# Patient Record
Sex: FEMALE | NOT HISPANIC OR LATINO | Employment: FULL TIME | URBAN - METROPOLITAN AREA
[De-identification: names, ages, dates, MRNs, and addresses within clinical notes are randomized per-mention and may not be internally consistent; named-entity substitution may affect disease eponyms.]

---

## 2017-01-23 ENCOUNTER — APPOINTMENT (OUTPATIENT)
Dept: GENERAL RADIOLOGY | Facility: HOSPITAL | Age: 52
End: 2017-01-23

## 2017-01-23 ENCOUNTER — HOSPITAL ENCOUNTER (EMERGENCY)
Facility: HOSPITAL | Age: 52
Discharge: HOME | End: 2017-01-23
Attending: EMERGENCY MEDICINE | Admitting: EMERGENCY MEDICINE

## 2017-01-23 VITALS
SYSTOLIC BLOOD PRESSURE: 142 MMHG | OXYGEN SATURATION: 100 % | WEIGHT: 170 LBS | DIASTOLIC BLOOD PRESSURE: 71 MMHG | TEMPERATURE: 100.6 F | HEART RATE: 109 BPM | HEIGHT: 69 IN | RESPIRATION RATE: 18 BRPM | BODY MASS INDEX: 25.18 KG/M2

## 2017-01-23 DIAGNOSIS — J44.9 ASTHMATIC BRONCHITIS , CHRONIC (HCC): Primary | ICD-10-CM

## 2017-01-23 LAB
ALBUMIN SERPL-MCNC: 3.3 G/DL (ref 3.5–5.2)
ALBUMIN/GLOB SERPL: 0.9 G/DL
ALP SERPL-CCNC: 92 U/L (ref 39–117)
ALT SERPL W P-5'-P-CCNC: 31 U/L (ref 1–33)
ANION GAP SERPL CALCULATED.3IONS-SCNC: 13.3 MMOL/L
AST SERPL-CCNC: 15 U/L (ref 1–32)
BASOPHILS # BLD AUTO: 0.01 10*3/MM3 (ref 0–0.2)
BASOPHILS NFR BLD AUTO: 0.1 % (ref 0–1.5)
BILIRUB SERPL-MCNC: 0.4 MG/DL (ref 0.1–1.2)
BUN BLD-MCNC: 14 MG/DL (ref 6–20)
BUN/CREAT SERPL: 19.7 (ref 7–25)
CALCIUM SPEC-SCNC: 8.9 MG/DL (ref 8.6–10.5)
CHLORIDE SERPL-SCNC: 101 MMOL/L (ref 98–107)
CO2 SERPL-SCNC: 25.7 MMOL/L (ref 22–29)
CREAT BLD-MCNC: 0.71 MG/DL (ref 0.57–1)
D-LACTATE SERPL-SCNC: 0.9 MMOL/L (ref 0.5–2)
DEPRECATED RDW RBC AUTO: 47.7 FL (ref 37–54)
EOSINOPHIL # BLD AUTO: 0.04 10*3/MM3 (ref 0–0.7)
EOSINOPHIL NFR BLD AUTO: 0.4 % (ref 0.3–6.2)
ERYTHROCYTE [DISTWIDTH] IN BLOOD BY AUTOMATED COUNT: 17.3 % (ref 11.7–13)
FLUAV AG NPH QL: NEGATIVE
FLUBV AG NPH QL IA: NEGATIVE
GFR SERPL CREATININE-BSD FRML MDRD: 86 ML/MIN/1.73
GLOBULIN UR ELPH-MCNC: 3.5 GM/DL
GLUCOSE BLD-MCNC: 114 MG/DL (ref 65–99)
HCT VFR BLD AUTO: 30.7 % (ref 35.6–45.5)
HGB BLD-MCNC: 8.9 G/DL (ref 11.9–15.5)
HOLD SPECIMEN: NORMAL
IMM GRANULOCYTES # BLD: 0.03 10*3/MM3 (ref 0–0.03)
IMM GRANULOCYTES NFR BLD: 0.3 % (ref 0–0.5)
LYMPHOCYTES # BLD AUTO: 1.65 10*3/MM3 (ref 0.9–4.8)
LYMPHOCYTES NFR BLD AUTO: 15.6 % (ref 19.6–45.3)
MCH RBC QN AUTO: 21.8 PG (ref 26.9–32)
MCHC RBC AUTO-ENTMCNC: 29 G/DL (ref 32.4–36.3)
MCV RBC AUTO: 75.1 FL (ref 80.5–98.2)
MONOCYTES # BLD AUTO: 1.02 10*3/MM3 (ref 0.2–1.2)
MONOCYTES NFR BLD AUTO: 9.7 % (ref 5–12)
NEUTROPHILS # BLD AUTO: 7.81 10*3/MM3 (ref 1.9–8.1)
NEUTROPHILS NFR BLD AUTO: 73.9 % (ref 42.7–76)
NT-PROBNP SERPL-MCNC: 233.4 PG/ML (ref 5–900)
PLATELET # BLD AUTO: 310 10*3/MM3 (ref 140–500)
PMV BLD AUTO: 8.9 FL (ref 6–12)
POTASSIUM BLD-SCNC: 3.6 MMOL/L (ref 3.5–5.2)
PROT SERPL-MCNC: 6.8 G/DL (ref 6–8.5)
RBC # BLD AUTO: 4.09 10*6/MM3 (ref 3.9–5.2)
SODIUM BLD-SCNC: 140 MMOL/L (ref 136–145)
TROPONIN T SERPL-MCNC: <0.01 NG/ML (ref 0–0.03)
WBC NRBC COR # BLD: 10.56 10*3/MM3 (ref 4.5–10.7)
WHOLE BLOOD HOLD SPECIMEN: NORMAL

## 2017-01-23 PROCEDURE — 93005 ELECTROCARDIOGRAM TRACING: CPT

## 2017-01-23 PROCEDURE — 93010 ELECTROCARDIOGRAM REPORT: CPT | Performed by: INTERNAL MEDICINE

## 2017-01-23 PROCEDURE — 36415 COLL VENOUS BLD VENIPUNCTURE: CPT | Performed by: EMERGENCY MEDICINE

## 2017-01-23 PROCEDURE — 99284 EMERGENCY DEPT VISIT MOD MDM: CPT

## 2017-01-23 PROCEDURE — 94640 AIRWAY INHALATION TREATMENT: CPT

## 2017-01-23 PROCEDURE — 63710000001 PREDNISONE PER 5 MG: Performed by: EMERGENCY MEDICINE

## 2017-01-23 PROCEDURE — 80053 COMPREHEN METABOLIC PANEL: CPT | Performed by: EMERGENCY MEDICINE

## 2017-01-23 PROCEDURE — 84484 ASSAY OF TROPONIN QUANT: CPT | Performed by: EMERGENCY MEDICINE

## 2017-01-23 PROCEDURE — 83605 ASSAY OF LACTIC ACID: CPT | Performed by: EMERGENCY MEDICINE

## 2017-01-23 PROCEDURE — 83880 ASSAY OF NATRIURETIC PEPTIDE: CPT | Performed by: EMERGENCY MEDICINE

## 2017-01-23 PROCEDURE — 85025 COMPLETE CBC W/AUTO DIFF WBC: CPT | Performed by: EMERGENCY MEDICINE

## 2017-01-23 PROCEDURE — 87804 INFLUENZA ASSAY W/OPTIC: CPT | Performed by: EMERGENCY MEDICINE

## 2017-01-23 PROCEDURE — 71020 HC CHEST PA AND LATERAL: CPT

## 2017-01-23 PROCEDURE — 87186 SC STD MICRODIL/AGAR DIL: CPT | Performed by: EMERGENCY MEDICINE

## 2017-01-23 PROCEDURE — 87040 BLOOD CULTURE FOR BACTERIA: CPT | Performed by: EMERGENCY MEDICINE

## 2017-01-23 RX ORDER — PREDNISONE 20 MG/1
60 TABLET ORAL ONCE
Status: COMPLETED | OUTPATIENT
Start: 2017-01-23 | End: 2017-01-23

## 2017-01-23 RX ORDER — FLUTICASONE PROPIONATE 50 MCG
2 SPRAY, SUSPENSION (ML) NASAL DAILY
COMMUNITY
End: 2017-04-25 | Stop reason: SDUPTHER

## 2017-01-23 RX ORDER — PAROXETINE HYDROCHLORIDE 20 MG/1
10 TABLET, FILM COATED ORAL EVERY MORNING
COMMUNITY
End: 2017-04-25 | Stop reason: SDUPTHER

## 2017-01-23 RX ORDER — ISOSORBIDE DINITRATE 30 MG/1
30 TABLET ORAL 2 TIMES DAILY
COMMUNITY
End: 2017-02-03

## 2017-01-23 RX ORDER — ACETAMINOPHEN 500 MG
1000 TABLET ORAL ONCE
Status: COMPLETED | OUTPATIENT
Start: 2017-01-23 | End: 2017-01-23

## 2017-01-23 RX ORDER — AZITHROMYCIN 250 MG/1
250 TABLET, FILM COATED ORAL DAILY
Qty: 6 TABLET | Refills: 0 | Status: SHIPPED | OUTPATIENT
Start: 2017-01-23 | End: 2017-01-27 | Stop reason: HOSPADM

## 2017-01-23 RX ORDER — IPRATROPIUM BROMIDE AND ALBUTEROL SULFATE 2.5; .5 MG/3ML; MG/3ML
3 SOLUTION RESPIRATORY (INHALATION) ONCE
Status: COMPLETED | OUTPATIENT
Start: 2017-01-23 | End: 2017-01-23

## 2017-01-23 RX ORDER — LOSARTAN POTASSIUM 50 MG/1
50 TABLET ORAL DAILY
COMMUNITY
End: 2017-02-03

## 2017-01-23 RX ORDER — METHYLPREDNISOLONE SODIUM SUCCINATE 125 MG/2ML
125 INJECTION, POWDER, LYOPHILIZED, FOR SOLUTION INTRAMUSCULAR; INTRAVENOUS ONCE
Status: DISCONTINUED | OUTPATIENT
Start: 2017-01-23 | End: 2017-01-23

## 2017-01-23 RX ORDER — SODIUM CHLORIDE 0.9 % (FLUSH) 0.9 %
10 SYRINGE (ML) INJECTION AS NEEDED
Status: DISCONTINUED | OUTPATIENT
Start: 2017-01-23 | End: 2017-01-24 | Stop reason: HOSPADM

## 2017-01-23 RX ORDER — ATORVASTATIN CALCIUM 40 MG/1
40 TABLET, FILM COATED ORAL DAILY
COMMUNITY
End: 2017-04-25 | Stop reason: SDUPTHER

## 2017-01-23 RX ORDER — AMLODIPINE BESYLATE 2.5 MG/1
2.5 TABLET ORAL DAILY
COMMUNITY
End: 2017-02-03

## 2017-01-23 RX ORDER — METHYLPREDNISOLONE 4 MG/1
TABLET ORAL
Qty: 21 TABLET | Refills: 0 | Status: SHIPPED | OUTPATIENT
Start: 2017-01-23 | End: 2017-01-27 | Stop reason: HOSPADM

## 2017-01-23 RX ORDER — MONTELUKAST SODIUM 10 MG/1
10 TABLET ORAL NIGHTLY
COMMUNITY
End: 2017-04-25 | Stop reason: SDUPTHER

## 2017-01-23 RX ORDER — ASPIRIN 81 MG/1
81 TABLET, CHEWABLE ORAL DAILY
COMMUNITY
End: 2017-08-22

## 2017-01-23 RX ORDER — FUROSEMIDE 20 MG/1
20 TABLET ORAL DAILY
COMMUNITY
End: 2017-04-25 | Stop reason: SDUPTHER

## 2017-01-23 RX ADMIN — IPRATROPIUM BROMIDE AND ALBUTEROL SULFATE 3 ML: .5; 3 SOLUTION RESPIRATORY (INHALATION) at 22:11

## 2017-01-23 RX ADMIN — ACETAMINOPHEN 1000 MG: 500 TABLET ORAL at 17:48

## 2017-01-23 RX ADMIN — PREDNISONE 60 MG: 20 TABLET ORAL at 22:35

## 2017-01-23 NOTE — ED NOTES
Patient states she has been running a high fever for over a week, has had difficulty breathing, bronchitis and a cough, and now her asthma has been triggered. Patient to the ED in [place] and all they did was perform a chest x-ray to rule out pneumonia which was negative, and sent her home for a nebulizer, and cough medication.      Neida Lima RN  01/23/17 1311

## 2017-01-24 ENCOUNTER — HOSPITAL ENCOUNTER (INPATIENT)
Facility: HOSPITAL | Age: 52
LOS: 2 days | Discharge: HOME | End: 2017-01-27
Attending: EMERGENCY MEDICINE | Admitting: HOSPITALIST

## 2017-01-24 ENCOUNTER — APPOINTMENT (OUTPATIENT)
Dept: GENERAL RADIOLOGY | Facility: HOSPITAL | Age: 52
End: 2017-01-24

## 2017-01-24 DIAGNOSIS — N39.0 URINARY TRACT INFECTION IN FEMALE: ICD-10-CM

## 2017-01-24 DIAGNOSIS — R78.81 BACTEREMIA DUE TO GRAM-NEGATIVE BACTERIA: Primary | ICD-10-CM

## 2017-01-24 LAB
ALBUMIN SERPL-MCNC: 3.4 G/DL (ref 3.5–5.2)
ALBUMIN/GLOB SERPL: 0.9 G/DL
ALP SERPL-CCNC: 90 U/L (ref 39–117)
ALT SERPL W P-5'-P-CCNC: 26 U/L (ref 1–33)
ANION GAP SERPL CALCULATED.3IONS-SCNC: 13.6 MMOL/L
ANISOCYTOSIS BLD QL: NORMAL
AST SERPL-CCNC: 19 U/L (ref 1–32)
BACTERIA UR QL AUTO: ABNORMAL /HPF
BASOPHILS # BLD AUTO: 0.01 10*3/MM3 (ref 0–0.2)
BASOPHILS NFR BLD AUTO: 0.1 % (ref 0–1.5)
BILIRUB SERPL-MCNC: 0.2 MG/DL (ref 0.1–1.2)
BILIRUB UR QL STRIP: NEGATIVE
BUN BLD-MCNC: 16 MG/DL (ref 6–20)
BUN/CREAT SERPL: 22.2 (ref 7–25)
C3 FRG RBC-MCNC: NORMAL
CALCIUM SPEC-SCNC: 9 MG/DL (ref 8.6–10.5)
CHLORIDE SERPL-SCNC: 102 MMOL/L (ref 98–107)
CLARITY UR: ABNORMAL
CO2 SERPL-SCNC: 24.4 MMOL/L (ref 22–29)
COARSE GRAN CASTS URNS QL MICRO: ABNORMAL /LPF
COLOR UR: YELLOW
CREAT BLD-MCNC: 0.72 MG/DL (ref 0.57–1)
D-LACTATE SERPL-SCNC: 1.2 MMOL/L (ref 0.5–2)
DEPRECATED RDW RBC AUTO: 46.9 FL (ref 37–54)
ELLIPTOCYTES BLD QL SMEAR: NORMAL
EOSINOPHIL # BLD AUTO: 0 10*3/MM3 (ref 0–0.7)
EOSINOPHIL NFR BLD AUTO: 0 % (ref 0.3–6.2)
ERYTHROCYTE [DISTWIDTH] IN BLOOD BY AUTOMATED COUNT: 17.3 % (ref 11.7–13)
GFR SERPL CREATININE-BSD FRML MDRD: 85 ML/MIN/1.73
GIANT PLATELETS: NORMAL
GLOBULIN UR ELPH-MCNC: 3.7 GM/DL
GLUCOSE BLD-MCNC: 144 MG/DL (ref 65–99)
GLUCOSE UR STRIP-MCNC: NEGATIVE MG/DL
HCT VFR BLD AUTO: 30.7 % (ref 35.6–45.5)
HGB BLD-MCNC: 9 G/DL (ref 11.9–15.5)
HGB UR QL STRIP.AUTO: NEGATIVE
HYALINE CASTS UR QL AUTO: ABNORMAL /LPF
IMM GRANULOCYTES # BLD: 0.1 10*3/MM3 (ref 0–0.03)
IMM GRANULOCYTES NFR BLD: 0.7 % (ref 0–0.5)
KETONES UR QL STRIP: NEGATIVE
LEUKOCYTE ESTERASE UR QL STRIP.AUTO: ABNORMAL
LIPASE SERPL-CCNC: 23 U/L (ref 13–60)
LYMPHOCYTES # BLD AUTO: 1.13 10*3/MM3 (ref 0.9–4.8)
LYMPHOCYTES NFR BLD AUTO: 8.3 % (ref 19.6–45.3)
MACROCYTES BLD QL SMEAR: NORMAL
MCH RBC QN AUTO: 21.6 PG (ref 26.9–32)
MCHC RBC AUTO-ENTMCNC: 29.3 G/DL (ref 32.4–36.3)
MCV RBC AUTO: 73.8 FL (ref 80.5–98.2)
MICROCYTES BLD QL: NORMAL
MONOCYTES # BLD AUTO: 0.76 10*3/MM3 (ref 0.2–1.2)
MONOCYTES NFR BLD AUTO: 5.6 % (ref 5–12)
NEUTROPHILS # BLD AUTO: 11.55 10*3/MM3 (ref 1.9–8.1)
NEUTROPHILS NFR BLD AUTO: 85.3 % (ref 42.7–76)
NITRITE UR QL STRIP: NEGATIVE
NRBC BLD MANUAL-RTO: 0 /100 WBC (ref 0–0)
PH UR STRIP.AUTO: 6.5 [PH] (ref 5–8)
PLATELET # BLD AUTO: 401 10*3/MM3 (ref 140–500)
PMV BLD AUTO: 9.6 FL (ref 6–12)
POTASSIUM BLD-SCNC: 4 MMOL/L (ref 3.5–5.2)
PROCALCITONIN SERPL-MCNC: 0.26 NG/ML (ref 0.1–0.25)
PROT SERPL-MCNC: 7.1 G/DL (ref 6–8.5)
PROT UR QL STRIP: ABNORMAL
RBC # BLD AUTO: 4.16 10*6/MM3 (ref 3.9–5.2)
RBC # UR: ABNORMAL /HPF
REF LAB TEST METHOD: ABNORMAL
SODIUM BLD-SCNC: 140 MMOL/L (ref 136–145)
SP GR UR STRIP: 1.02 (ref 1–1.03)
SQUAMOUS #/AREA URNS HPF: ABNORMAL /HPF
UROBILINOGEN UR QL STRIP: ABNORMAL
WBC MORPH BLD: NORMAL
WBC NRBC COR # BLD: 13.55 10*3/MM3 (ref 4.5–10.7)
WBC UR QL AUTO: ABNORMAL /HPF

## 2017-01-24 PROCEDURE — 83605 ASSAY OF LACTIC ACID: CPT | Performed by: PHYSICIAN ASSISTANT

## 2017-01-24 PROCEDURE — 83690 ASSAY OF LIPASE: CPT | Performed by: PHYSICIAN ASSISTANT

## 2017-01-24 PROCEDURE — 81001 URINALYSIS AUTO W/SCOPE: CPT | Performed by: PHYSICIAN ASSISTANT

## 2017-01-24 PROCEDURE — 80053 COMPREHEN METABOLIC PANEL: CPT | Performed by: PHYSICIAN ASSISTANT

## 2017-01-24 PROCEDURE — 87186 SC STD MICRODIL/AGAR DIL: CPT | Performed by: PHYSICIAN ASSISTANT

## 2017-01-24 PROCEDURE — 36415 COLL VENOUS BLD VENIPUNCTURE: CPT

## 2017-01-24 PROCEDURE — 99284 EMERGENCY DEPT VISIT MOD MDM: CPT

## 2017-01-24 PROCEDURE — 84145 PROCALCITONIN (PCT): CPT | Performed by: PHYSICIAN ASSISTANT

## 2017-01-24 PROCEDURE — 87086 URINE CULTURE/COLONY COUNT: CPT | Performed by: PHYSICIAN ASSISTANT

## 2017-01-24 PROCEDURE — 85007 BL SMEAR W/DIFF WBC COUNT: CPT | Performed by: PHYSICIAN ASSISTANT

## 2017-01-24 PROCEDURE — 85025 COMPLETE CBC W/AUTO DIFF WBC: CPT | Performed by: PHYSICIAN ASSISTANT

## 2017-01-24 PROCEDURE — 71020 HC CHEST PA AND LATERAL: CPT

## 2017-01-24 RX ORDER — BENZONATATE 100 MG/1
100 CAPSULE ORAL 3 TIMES DAILY PRN
COMMUNITY
End: 2017-04-04

## 2017-01-24 RX ORDER — SODIUM CHLORIDE 0.9 % (FLUSH) 0.9 %
10 SYRINGE (ML) INJECTION AS NEEDED
Status: DISCONTINUED | OUTPATIENT
Start: 2017-01-24 | End: 2017-01-27 | Stop reason: HOSPADM

## 2017-01-24 RX ORDER — CETIRIZINE HYDROCHLORIDE 10 MG/1
10 TABLET ORAL DAILY
COMMUNITY

## 2017-01-24 RX ADMIN — SODIUM CHLORIDE 1000 ML: 9 INJECTION, SOLUTION INTRAVENOUS at 22:10

## 2017-01-24 RX ADMIN — SODIUM CHLORIDE 2313 ML: 9 INJECTION, SOLUTION INTRAVENOUS at 23:22

## 2017-01-24 RX ADMIN — ERTAPENEM SODIUM 1 G: 1 INJECTION, POWDER, LYOPHILIZED, FOR SOLUTION INTRAMUSCULAR; INTRAVENOUS at 22:54

## 2017-01-24 NOTE — ED PROVIDER NOTES
EMERGENCY DEPARTMENT ENCOUNTER    CHIEF COMPLAINT  Chief Complaint: Fever  History given by: patient  History limited by: n/a  Room Number: 13/13  PMD: No Known Provider      HPI:  Pt is a 51 y.o. female who presents complaining of a fever that began last week. She reports a daily high fever and cough. Pt reports bronchitis for the past 2 months which has been treated with abx and steroids with out improvement. She denies chest pain. Pt reports a hx of asthma, which she states has worsened recently with the bronchitis.     Duration:  1 week   Onset: gradual  Timing: constant  Location: generalized   Radiation: n/a  Quality: 102.7 on arrival  Intensity/Severity: moderate  Progression: unchanged  Associated Symptoms: cough  Aggravating Factors: none  Alleviating Factors: none  Previous Episodes: recent bronchitis   Treatment before arrival: abx and steroids.     PAST MEDICAL HISTORY  Active Ambulatory Problems     Diagnosis Date Noted   • No Active Ambulatory Problems     Resolved Ambulatory Problems     Diagnosis Date Noted   • No Resolved Ambulatory Problems     Past Medical History   Diagnosis Date   • Asthma    • Heart disease    • Seasonal allergies        PAST SURGICAL HISTORY  Past Surgical History   Procedure Laterality Date   • Mandible surgery     • Carpal tunnel release     • Ovarian cyst removal     • Hysterectomy     • Cholecystectomy     • Shoulder surgery         FAMILY HISTORY  History reviewed. No pertinent family history.    SOCIAL HISTORY  Social History     Social History   • Marital status:      Spouse name: N/A   • Number of children: N/A   • Years of education: N/A     Occupational History   • Not on file.     Social History Main Topics   • Smoking status: Never Smoker   • Smokeless tobacco: Not on file   • Alcohol use Yes      Comment: occ    • Drug use: No   • Sexual activity: Not on file     Other Topics Concern   • Not on file     Social History Narrative   • No narrative on file        ALLERGIES  Motrin [ibuprofen]    REVIEW OF SYSTEMS  Review of Systems   Constitutional: Positive for fever. Negative for chills.   HENT: Negative for congestion and sore throat.    Eyes: Negative.    Respiratory: Positive for cough. Negative for shortness of breath.    Cardiovascular: Negative for chest pain and leg swelling.   Gastrointestinal: Negative for abdominal pain, diarrhea and vomiting.   Genitourinary: Negative for difficulty urinating and dysuria.   Musculoskeletal: Negative for back pain and neck pain.   Skin: Negative for rash and wound.   Allergic/Immunologic: Negative.    Neurological: Negative for dizziness, weakness, numbness and headaches.   Psychiatric/Behavioral: Negative.    All other systems reviewed and are negative.      PHYSICAL EXAM  ED Triage Vitals   Temp Heart Rate Resp BP SpO2   01/23/17 1724 01/23/17 1724 01/23/17 1724 01/23/17 1736 01/23/17 1724   102.7 °F (39.3 °C) 106 16 132/80 100 %      Temp src Heart Rate Source Patient Position BP Location FiO2 (%)   -- -- 01/23/17 1736 -- --     Sitting         Physical Exam   Constitutional: She is oriented to person, place, and time and well-developed, well-nourished, and in no distress. No distress.   HENT:   Head: Normocephalic and atraumatic.   Eyes: EOM are normal. Pupils are equal, round, and reactive to light.   Neck: Normal range of motion. Neck supple.   Cardiovascular: Regular rhythm and normal heart sounds.  Tachycardia present.    Pulmonary/Chest: Effort normal. No respiratory distress. She has wheezes (diffuse).   Spasmodic, non productive cough.     Abdominal: Soft. There is no tenderness. There is no rebound and no guarding.   Musculoskeletal: Normal range of motion. She exhibits no edema.   Neurological: She is alert and oriented to person, place, and time.   Skin: Skin is warm and dry. No rash noted.   Psychiatric: Mood and affect normal.   Nursing note and vitals reviewed.      LAB RESULTS  Lab Results (last 24 hours)      Procedure Component Value Units Date/Time    CBC & Differential [[id number]] Collected:  01/23/17 1807    Specimen:  Blood Updated:  01/23/17 1842    Narrative:       The following orders were created for panel order CBC & Differential.  Procedure                               Abnormality         Status                     ---------                               -----------         ------                     CBC Auto Differential[[id number]]         Abnormal            Final result                 Please view results for these tests on the individual orders.    Comprehensive Metabolic Panel [[id number]]  (Abnormal) Collected:  01/23/17 1807    Specimen:  Blood Updated:  01/23/17 1906     Glucose 114 (H) mg/dL      BUN 14 mg/dL      Creatinine 0.71 mg/dL      Sodium 140 mmol/L      Potassium 3.6 mmol/L      Chloride 101 mmol/L      CO2 25.7 mmol/L      Calcium 8.9 mg/dL      Total Protein 6.8 g/dL      Albumin 3.30 (L) g/dL      ALT (SGPT) 31 U/L      AST (SGOT) 15 U/L      Alkaline Phosphatase 92 U/L      Total Bilirubin 0.4 mg/dL      eGFR Non African Amer 86 mL/min/1.73      Globulin 3.5 gm/dL      A/G Ratio 0.9 g/dL      BUN/Creatinine Ratio 19.7      Anion Gap 13.3 mmol/L     Lactic Acid, Plasma [[id number]]  (Normal) Collected:  01/23/17 1807    Specimen:  Blood Updated:  01/23/17 1850     Lactate 0.9 mmol/L     Blood Culture [[id number]] Collected:  01/23/17 1807    Specimen:  Blood from Arm, Left Updated:  01/23/17 1834    CBC Auto Differential [[id number]]  (Abnormal) Collected:  01/23/17 1807    Specimen:  Blood Updated:  01/23/17 1842     WBC 10.56 10*3/mm3      RBC 4.09 10*6/mm3      Hemoglobin 8.9 (L) g/dL      Hematocrit 30.7 (L) %      MCV 75.1 (L) fL      MCH 21.8 (L) pg      MCHC 29.0 (L) g/dL      RDW 17.3 (H) %      RDW-SD 47.7 fl      MPV 8.9 fL      Platelets 310 10*3/mm3      Neutrophil % 73.9 %      Lymphocyte % 15.6 (L) %      Monocyte % 9.7 %      Eosinophil % 0.4 %      Basophil % 0.1 %      Immature  Grans % 0.3 %      Neutrophils, Absolute 7.81 10*3/mm3      Lymphocytes, Absolute 1.65 10*3/mm3      Monocytes, Absolute 1.02 10*3/mm3      Eosinophils, Absolute 0.04 10*3/mm3      Basophils, Absolute 0.01 10*3/mm3      Immature Grans, Absolute 0.03 10*3/mm3     BNP [[id number]]  (Normal) Collected:  01/23/17 1807    Specimen:  Blood Updated:  01/23/17 1903     proBNP 233.4 pg/mL     Narrative:       Among patients with dyspnea, NT-proBNP is highly sensitive for the detection of acute congestive heart failure. In addition NT-proBNP of <300 pg/ml effectively rules out acute congestive heart failure with 99% negative predictive value.    Troponin [[id number]]  (Normal) Collected:  01/23/17 1807    Specimen:  Blood Updated:  01/23/17 1906     Troponin T <0.010 ng/mL     Narrative:       Troponin T Reference Ranges:  Less than 0.03 ng/mL:    Negative for AMI  0.03 to 0.09 ng/mL:      Indeterminant for AMI  Greater than 0.09 ng/mL: Positive for AMI    Blood Culture [[id number]] Collected:  01/23/17 1811    Specimen:  Blood from Arm, Left Updated:  01/23/17 1834    Influenza Antigen [[id number]]  (Normal) Collected:  01/23/17 2150    Specimen:  Swab from Nasopharynx Updated:  01/23/17 2221     Influenza A Ag, EIA Negative      Influenza B Ag, EIA Negative           I ordered the above labs and reviewed the results    RADIOLOGY  XR Chest 2 View   Preliminary Result   No acute process.               I ordered the above noted radiological studies. Interpreted by radiologist. Reviewed by me in PACS.       PROCEDURES  Procedures    EKG           EKG time: 17:29  Rhythm/Rate: NSR 97  P waves and AZ: nml  QRS, axis: nml   ST and T waves: nml     Interpreted Contemporaneously by me, independently viewed  No prior for comparison.     PROGRESS AND CONSULTS  ED Course     17:38  Labs, CXR, and EKG ordered for further evaluation.     21:34  Solu-medrol and Duo-neb ordered.     22:24  Work up in the ED show NAD. Pt will be discharged.      MEDICAL DECISION MAKING  Results were reviewed/discussed with the patient and they were also made aware of online access. Pt also made aware that some labs, such as cultures, will not be resulted during ER visit and follow up with PMD is necessary.     MDM  Number of Diagnoses or Management Options  Asthmatic bronchitis , chronic:      Amount and/or Complexity of Data Reviewed  Clinical lab tests: ordered and reviewed (Troponin is <0.010  BNP is 233.4  Influenza is negative)  Tests in the radiology section of CPT®: ordered and reviewed (CXR shows NAD)  Tests in the medicine section of CPT®: ordered and reviewed (See EKG procedure note. )  Independent visualization of images, tracings, or specimens: yes    Patient Progress  Patient progress: stable         DIAGNOSIS  Final diagnoses:   Asthmatic bronchitis , chronic       DISPOSITION  DISCHARGE    Patient discharged in stable condition.    Reviewed implications of results, diagnosis, meds, responsibility to follow up, warning signs and symptoms of possible worsening, potential complications and reasons to return to ER.    Patient/Family voiced understanding of above instructions.    Discussed plan for discharge, as there is no emergent indication for admission.  Pt/family is agreeable and understands need for follow up and repeat testing.  Pt is aware that discharge does not mean that nothing is wrong but it indicates no emergency is present that requires admission and they must continue care with follow-up as given below or physician of their choice.     FOLLOW-UP  [facility]  [facility] [id number] [phone]             Medication List      New Prescriptions          azithromycin 250 MG tablet   Commonly known as:  ZITHROMAX   Take 1 tablet by mouth Daily. Take 2 tablets the first day, then 1 tablet   daily for 4 days.       MethylPREDNISolone 4 MG tablet   Commonly known as:  MEDROL (MYKE)   Take as directed on  package instructions.           Latest Documented Vital Signs:  As of 10:26 PM  BP- 132/80 HR- 83 Temp- 99.8 °F (37.7 °C) O2 sat- 100%    --  Documentation assistance provided by rahel Valles for Dr Osborn.  Information recorded by the marryibshalini was done at my direction and has been verified and validated by me.        Angeles Valles  01/23/17 2226       Jt Osborn MD  01/24/17 0204

## 2017-01-25 ENCOUNTER — APPOINTMENT (OUTPATIENT)
Dept: CT IMAGING | Facility: HOSPITAL | Age: 52
End: 2017-01-25
Attending: INTERNAL MEDICINE

## 2017-01-25 PROBLEM — K59.00 CONSTIPATION: Chronic | Status: ACTIVE | Noted: 2017-01-25

## 2017-01-25 PROBLEM — R78.81 BACTEREMIA DUE TO GRAM-NEGATIVE BACTERIA: Status: ACTIVE | Noted: 2017-01-25

## 2017-01-25 PROBLEM — R05.9 COUGH: Chronic | Status: ACTIVE | Noted: 2017-01-25

## 2017-01-25 PROBLEM — D64.9 ANEMIA: Status: ACTIVE | Noted: 2017-01-25

## 2017-01-25 PROBLEM — I10 ESSENTIAL HYPERTENSION: Chronic | Status: ACTIVE | Noted: 2017-01-25

## 2017-01-25 LAB
ALBUMIN SERPL-MCNC: 2.9 G/DL (ref 3.5–5.2)
ALBUMIN/GLOB SERPL: 0.8 G/DL
ALP SERPL-CCNC: 101 U/L (ref 39–117)
ALT SERPL W P-5'-P-CCNC: 44 U/L (ref 1–33)
ANION GAP SERPL CALCULATED.3IONS-SCNC: 11.7 MMOL/L
AST SERPL-CCNC: 28 U/L (ref 1–32)
BASOPHILS # BLD AUTO: 0.01 10*3/MM3 (ref 0–0.2)
BASOPHILS NFR BLD AUTO: 0.1 % (ref 0–1.5)
BILIRUB SERPL-MCNC: 0.3 MG/DL (ref 0.1–1.2)
BUN BLD-MCNC: 12 MG/DL (ref 6–20)
BUN/CREAT SERPL: 18.5 (ref 7–25)
CALCIUM SPEC-SCNC: 8.4 MG/DL (ref 8.6–10.5)
CHLORIDE SERPL-SCNC: 105 MMOL/L (ref 98–107)
CO2 SERPL-SCNC: 24.3 MMOL/L (ref 22–29)
CREAT BLD-MCNC: 0.65 MG/DL (ref 0.57–1)
DEPRECATED RDW RBC AUTO: 47.8 FL (ref 37–54)
EOSINOPHIL # BLD AUTO: 0 10*3/MM3 (ref 0–0.7)
EOSINOPHIL NFR BLD AUTO: 0 % (ref 0.3–6.2)
ERYTHROCYTE [DISTWIDTH] IN BLOOD BY AUTOMATED COUNT: 17.4 % (ref 11.7–13)
FERRITIN SERPL-MCNC: 185 NG/ML (ref 13–150)
FOLATE SERPL-MCNC: 8.28 NG/ML (ref 4.78–24.2)
GASTROCULT GAST QL: NEGATIVE
GFR SERPL CREATININE-BSD FRML MDRD: 96 ML/MIN/1.73
GLOBULIN UR ELPH-MCNC: 3.8 GM/DL
GLUCOSE BLD-MCNC: 102 MG/DL (ref 65–99)
HCT VFR BLD AUTO: 29.5 % (ref 35.6–45.5)
HGB BLD-MCNC: 8.6 G/DL (ref 11.9–15.5)
IMM GRANULOCYTES # BLD: 0.05 10*3/MM3 (ref 0–0.03)
IMM GRANULOCYTES NFR BLD: 0.3 % (ref 0–0.5)
IRON 24H UR-MRATE: 18 MCG/DL (ref 37–145)
IRON SATN MFR SERPL: 8 % (ref 20–50)
LYMPHOCYTES # BLD AUTO: 2.45 10*3/MM3 (ref 0.9–4.8)
LYMPHOCYTES NFR BLD AUTO: 16.6 % (ref 19.6–45.3)
MCH RBC QN AUTO: 21.7 PG (ref 26.9–32)
MCHC RBC AUTO-ENTMCNC: 29.2 G/DL (ref 32.4–36.3)
MCV RBC AUTO: 74.5 FL (ref 80.5–98.2)
MONOCYTES # BLD AUTO: 1.07 10*3/MM3 (ref 0.2–1.2)
MONOCYTES NFR BLD AUTO: 7.2 % (ref 5–12)
NEUTROPHILS # BLD AUTO: 11.21 10*3/MM3 (ref 1.9–8.1)
NEUTROPHILS NFR BLD AUTO: 75.8 % (ref 42.7–76)
NRBC BLD MANUAL-RTO: 0 /100 WBC (ref 0–0)
PLATELET # BLD AUTO: 376 10*3/MM3 (ref 140–500)
PMV BLD AUTO: 9.2 FL (ref 6–12)
POTASSIUM BLD-SCNC: 3.9 MMOL/L (ref 3.5–5.2)
PROT SERPL-MCNC: 6.7 G/DL (ref 6–8.5)
RBC # BLD AUTO: 3.96 10*6/MM3 (ref 3.9–5.2)
RETICS/RBC NFR AUTO: 0.59 % (ref 0.5–1.5)
SODIUM BLD-SCNC: 141 MMOL/L (ref 136–145)
TIBC SERPL-MCNC: 215 MCG/DL (ref 298–536)
TRANSFERRIN SERPL-MCNC: 144 MG/DL (ref 200–360)
VIT B12 BLD-MCNC: 312 PG/ML (ref 211–946)
WBC NRBC COR # BLD: 14.79 10*3/MM3 (ref 4.5–10.7)

## 2017-01-25 PROCEDURE — 99255 IP/OBS CONSLTJ NEW/EST HI 80: CPT | Performed by: INTERNAL MEDICINE

## 2017-01-25 PROCEDURE — 82270 OCCULT BLOOD FECES: CPT | Performed by: HOSPITALIST

## 2017-01-25 PROCEDURE — 25010000002 ENOXAPARIN PER 10 MG: Performed by: HOSPITALIST

## 2017-01-25 PROCEDURE — 93005 ELECTROCARDIOGRAM TRACING: CPT | Performed by: EMERGENCY MEDICINE

## 2017-01-25 PROCEDURE — 82728 ASSAY OF FERRITIN: CPT | Performed by: HOSPITALIST

## 2017-01-25 PROCEDURE — 0 IOPAMIDOL 61 % SOLUTION: Performed by: INTERNAL MEDICINE

## 2017-01-25 PROCEDURE — 74177 CT ABD & PELVIS W/CONTRAST: CPT

## 2017-01-25 PROCEDURE — 25010000003 CEFTRIAXONE PER 250 MG: Performed by: INTERNAL MEDICINE

## 2017-01-25 PROCEDURE — 83540 ASSAY OF IRON: CPT | Performed by: HOSPITALIST

## 2017-01-25 PROCEDURE — 85045 AUTOMATED RETICULOCYTE COUNT: CPT | Performed by: HOSPITALIST

## 2017-01-25 PROCEDURE — 80053 COMPREHEN METABOLIC PANEL: CPT | Performed by: HOSPITALIST

## 2017-01-25 PROCEDURE — 93010 ELECTROCARDIOGRAM REPORT: CPT | Performed by: INTERNAL MEDICINE

## 2017-01-25 PROCEDURE — 85025 COMPLETE CBC W/AUTO DIFF WBC: CPT | Performed by: HOSPITALIST

## 2017-01-25 PROCEDURE — 82746 ASSAY OF FOLIC ACID SERUM: CPT | Performed by: HOSPITALIST

## 2017-01-25 PROCEDURE — 84466 ASSAY OF TRANSFERRIN: CPT | Performed by: HOSPITALIST

## 2017-01-25 PROCEDURE — 25510000001 DIATRIZOATE MEGLUMINE & SODIUM PER 1 ML: Performed by: INTERNAL MEDICINE

## 2017-01-25 PROCEDURE — 82607 VITAMIN B-12: CPT | Performed by: HOSPITALIST

## 2017-01-25 PROCEDURE — 87040 BLOOD CULTURE FOR BACTERIA: CPT | Performed by: INTERNAL MEDICINE

## 2017-01-25 RX ORDER — MORPHINE SULFATE 2 MG/ML
1 INJECTION, SOLUTION INTRAMUSCULAR; INTRAVENOUS EVERY 4 HOURS PRN
Status: DISCONTINUED | OUTPATIENT
Start: 2017-01-25 | End: 2017-01-27 | Stop reason: HOSPADM

## 2017-01-25 RX ORDER — MONTELUKAST SODIUM 10 MG/1
10 TABLET ORAL NIGHTLY
Status: DISCONTINUED | OUTPATIENT
Start: 2017-01-25 | End: 2017-01-27 | Stop reason: HOSPADM

## 2017-01-25 RX ORDER — SODIUM CHLORIDE 0.9 % (FLUSH) 0.9 %
1-10 SYRINGE (ML) INJECTION AS NEEDED
Status: DISCONTINUED | OUTPATIENT
Start: 2017-01-25 | End: 2017-01-27 | Stop reason: HOSPADM

## 2017-01-25 RX ORDER — ALBUTEROL SULFATE 90 UG/1
2 AEROSOL, METERED RESPIRATORY (INHALATION) EVERY 4 HOURS PRN
COMMUNITY

## 2017-01-25 RX ORDER — MAGNESIUM HYDROXIDE/ALUMINUM HYDROXICE/SIMETHICONE 120; 1200; 1200 MG/30ML; MG/30ML; MG/30ML
30 SUSPENSION ORAL EVERY 6 HOURS PRN
Status: DISCONTINUED | OUTPATIENT
Start: 2017-01-25 | End: 2017-01-27 | Stop reason: HOSPADM

## 2017-01-25 RX ORDER — CEFTRIAXONE SODIUM 2 G/50ML
2 INJECTION, SOLUTION INTRAVENOUS EVERY 24 HOURS
Status: DISCONTINUED | OUTPATIENT
Start: 2017-01-25 | End: 2017-01-27 | Stop reason: HOSPADM

## 2017-01-25 RX ORDER — NAPROXEN SODIUM 220 MG
220 TABLET ORAL 2 TIMES DAILY PRN
COMMUNITY
End: 2017-01-27 | Stop reason: HOSPADM

## 2017-01-25 RX ORDER — AMLODIPINE BESYLATE 2.5 MG/1
2.5 TABLET ORAL DAILY
Status: DISCONTINUED | OUTPATIENT
Start: 2017-01-25 | End: 2017-01-27 | Stop reason: HOSPADM

## 2017-01-25 RX ORDER — POLYETHYLENE GLYCOL 3350 17 G/17G
17 POWDER, FOR SOLUTION ORAL DAILY
Status: DISCONTINUED | OUTPATIENT
Start: 2017-01-25 | End: 2017-01-27 | Stop reason: HOSPADM

## 2017-01-25 RX ORDER — PAROXETINE HYDROCHLORIDE 20 MG/1
20 TABLET, FILM COATED ORAL EVERY MORNING
Status: DISCONTINUED | OUTPATIENT
Start: 2017-01-25 | End: 2017-01-27 | Stop reason: HOSPADM

## 2017-01-25 RX ORDER — LORAZEPAM 2 MG/ML
0.5 INJECTION INTRAMUSCULAR EVERY 6 HOURS PRN
Status: DISCONTINUED | OUTPATIENT
Start: 2017-01-25 | End: 2017-01-27 | Stop reason: HOSPADM

## 2017-01-25 RX ORDER — HYDROCODONE BITARTRATE AND ACETAMINOPHEN 5; 325 MG/1; MG/1
1 TABLET ORAL EVERY 4 HOURS PRN
Status: DISCONTINUED | OUTPATIENT
Start: 2017-01-25 | End: 2017-01-27 | Stop reason: HOSPADM

## 2017-01-25 RX ORDER — CETIRIZINE HYDROCHLORIDE 10 MG/1
10 TABLET ORAL DAILY
Status: DISCONTINUED | OUTPATIENT
Start: 2017-01-25 | End: 2017-01-27 | Stop reason: HOSPADM

## 2017-01-25 RX ORDER — NALOXONE HCL 0.4 MG/ML
0.4 VIAL (ML) INJECTION
Status: DISCONTINUED | OUTPATIENT
Start: 2017-01-25 | End: 2017-01-27 | Stop reason: HOSPADM

## 2017-01-25 RX ORDER — BISACODYL 10 MG
10 SUPPOSITORY, RECTAL RECTAL DAILY PRN
Status: DISCONTINUED | OUTPATIENT
Start: 2017-01-25 | End: 2017-01-27 | Stop reason: HOSPADM

## 2017-01-25 RX ORDER — FUROSEMIDE 20 MG/1
20 TABLET ORAL DAILY
Status: DISCONTINUED | OUTPATIENT
Start: 2017-01-25 | End: 2017-01-27 | Stop reason: HOSPADM

## 2017-01-25 RX ORDER — ASPIRIN 81 MG/1
81 TABLET, CHEWABLE ORAL DAILY
Status: DISCONTINUED | OUTPATIENT
Start: 2017-01-25 | End: 2017-01-27 | Stop reason: HOSPADM

## 2017-01-25 RX ORDER — ONDANSETRON 2 MG/ML
4 INJECTION INTRAMUSCULAR; INTRAVENOUS EVERY 6 HOURS PRN
Status: DISCONTINUED | OUTPATIENT
Start: 2017-01-25 | End: 2017-01-27 | Stop reason: HOSPADM

## 2017-01-25 RX ORDER — LOSARTAN POTASSIUM 50 MG/1
50 TABLET ORAL DAILY
Status: DISCONTINUED | OUTPATIENT
Start: 2017-01-25 | End: 2017-01-27 | Stop reason: HOSPADM

## 2017-01-25 RX ORDER — ISOSORBIDE DINITRATE 30 MG/1
30 TABLET ORAL 2 TIMES DAILY
Status: DISCONTINUED | OUTPATIENT
Start: 2017-01-25 | End: 2017-01-27 | Stop reason: HOSPADM

## 2017-01-25 RX ORDER — ACETAMINOPHEN 325 MG/1
650 TABLET ORAL EVERY 4 HOURS PRN
Status: DISCONTINUED | OUTPATIENT
Start: 2017-01-25 | End: 2017-01-27 | Stop reason: HOSPADM

## 2017-01-25 RX ORDER — SENNA AND DOCUSATE SODIUM 50; 8.6 MG/1; MG/1
2 TABLET, FILM COATED ORAL NIGHTLY
Status: DISCONTINUED | OUTPATIENT
Start: 2017-01-25 | End: 2017-01-27 | Stop reason: HOSPADM

## 2017-01-25 RX ORDER — FLUTICASONE PROPIONATE 50 MCG
2 SPRAY, SUSPENSION (ML) NASAL DAILY
Status: DISCONTINUED | OUTPATIENT
Start: 2017-01-25 | End: 2017-01-27 | Stop reason: HOSPADM

## 2017-01-25 RX ORDER — BENZONATATE 100 MG/1
100 CAPSULE ORAL 3 TIMES DAILY PRN
Status: DISCONTINUED | OUTPATIENT
Start: 2017-01-25 | End: 2017-01-27 | Stop reason: HOSPADM

## 2017-01-25 RX ORDER — ATORVASTATIN CALCIUM 40 MG/1
40 TABLET, FILM COATED ORAL DAILY
Status: DISCONTINUED | OUTPATIENT
Start: 2017-01-25 | End: 2017-01-27 | Stop reason: HOSPADM

## 2017-01-25 RX ADMIN — ISOSORBIDE DINITRATE 30 MG: 30 TABLET ORAL at 08:07

## 2017-01-25 RX ADMIN — ATORVASTATIN CALCIUM 40 MG: 40 TABLET, FILM COATED ORAL at 08:07

## 2017-01-25 RX ADMIN — AMLODIPINE BESYLATE 2.5 MG: 2.5 TABLET ORAL at 08:07

## 2017-01-25 RX ADMIN — DIATRIZOATE MEGLUMINE AND DIATRIZOATE SODIUM 30 ML: 600; 100 SOLUTION ORAL; RECTAL at 12:25

## 2017-01-25 RX ADMIN — LOSARTAN POTASSIUM 50 MG: 50 TABLET, FILM COATED ORAL at 08:07

## 2017-01-25 RX ADMIN — DOCUSATE SODIUM -SENNOSIDES 2 TABLET: 50; 8.6 TABLET, COATED ORAL at 20:48

## 2017-01-25 RX ADMIN — BENZONATATE 100 MG: 100 CAPSULE ORAL at 17:07

## 2017-01-25 RX ADMIN — MONTELUKAST 10 MG: 10 TABLET, FILM COATED ORAL at 20:48

## 2017-01-25 RX ADMIN — CEFTRIAXONE SODIUM 2 G: 2 INJECTION, SOLUTION INTRAVENOUS at 12:06

## 2017-01-25 RX ADMIN — ASPIRIN 81 MG: 81 TABLET, CHEWABLE ORAL at 08:07

## 2017-01-25 RX ADMIN — ISOSORBIDE DINITRATE 30 MG: 30 TABLET ORAL at 17:07

## 2017-01-25 RX ADMIN — FUROSEMIDE 20 MG: 20 TABLET ORAL at 08:07

## 2017-01-25 RX ADMIN — HYDROCODONE BITARTRATE AND ACETAMINOPHEN 1 TABLET: 5; 325 TABLET ORAL at 11:58

## 2017-01-25 RX ADMIN — POLYETHYLENE GLYCOL 3350 17 G: 17 POWDER, FOR SOLUTION ORAL at 08:07

## 2017-01-25 RX ADMIN — HYDROCODONE BITARTRATE AND ACETAMINOPHEN 1 TABLET: 5; 325 TABLET ORAL at 17:07

## 2017-01-25 RX ADMIN — IOPAMIDOL 85 ML: 612 INJECTION, SOLUTION INTRAVENOUS at 14:45

## 2017-01-25 RX ADMIN — HYDROCODONE BITARTRATE AND ACETAMINOPHEN 1 TABLET: 5; 325 TABLET ORAL at 20:50

## 2017-01-25 RX ADMIN — CETIRIZINE HYDROCHLORIDE 10 MG: 10 TABLET, FILM COATED ORAL at 08:07

## 2017-01-25 RX ADMIN — PAROXETINE HYDROCHLORIDE HEMIHYDRATE 20 MG: 20 TABLET, FILM COATED ORAL at 08:07

## 2017-01-25 RX ADMIN — ENOXAPARIN SODIUM 40 MG: 40 INJECTION SUBCUTANEOUS at 08:07

## 2017-01-26 PROBLEM — D50.0 IRON DEFICIENCY ANEMIA DUE TO CHRONIC BLOOD LOSS: Status: ACTIVE | Noted: 2017-01-25

## 2017-01-26 PROBLEM — D72.829 LEUKOCYTOSIS: Status: ACTIVE | Noted: 2017-01-26

## 2017-01-26 PROBLEM — A41.50 SEPSIS DUE TO GRAM NEGATIVE BACTERIA (HCC): Status: ACTIVE | Noted: 2017-01-26

## 2017-01-26 PROBLEM — N10 ACUTE PYELONEPHRITIS: Status: ACTIVE | Noted: 2017-01-26

## 2017-01-26 PROBLEM — N39.0 UTI (URINARY TRACT INFECTION): Status: ACTIVE | Noted: 2017-01-26

## 2017-01-26 LAB
BACTERIA SPEC AEROBE CULT: ABNORMAL
CREAT BLD-MCNC: 0.64 MG/DL (ref 0.57–1)
DEPRECATED RDW RBC AUTO: 47.8 FL (ref 37–54)
ERYTHROCYTE [DISTWIDTH] IN BLOOD BY AUTOMATED COUNT: 17.4 % (ref 11.7–13)
GFR SERPL CREATININE-BSD FRML MDRD: 97 ML/MIN/1.73
GRAM STN SPEC: ABNORMAL
HCT VFR BLD AUTO: 27.3 % (ref 35.6–45.5)
HGB BLD-MCNC: 8.1 G/DL (ref 11.9–15.5)
MCH RBC QN AUTO: 22.1 PG (ref 26.9–32)
MCHC RBC AUTO-ENTMCNC: 29.7 G/DL (ref 32.4–36.3)
MCV RBC AUTO: 74.6 FL (ref 80.5–98.2)
PLATELET # BLD AUTO: 313 10*3/MM3 (ref 140–500)
PMV BLD AUTO: 8.8 FL (ref 6–12)
RBC # BLD AUTO: 3.66 10*6/MM3 (ref 3.9–5.2)
WBC NRBC COR # BLD: 9.52 10*3/MM3 (ref 4.5–10.7)

## 2017-01-26 PROCEDURE — 82565 ASSAY OF CREATININE: CPT | Performed by: INTERNAL MEDICINE

## 2017-01-26 PROCEDURE — 85027 COMPLETE CBC AUTOMATED: CPT | Performed by: INTERNAL MEDICINE

## 2017-01-26 PROCEDURE — 25010000003 CEFTRIAXONE PER 250 MG: Performed by: INTERNAL MEDICINE

## 2017-01-26 PROCEDURE — 99232 SBSQ HOSP IP/OBS MODERATE 35: CPT | Performed by: INTERNAL MEDICINE

## 2017-01-26 PROCEDURE — 25010000002 ENOXAPARIN PER 10 MG: Performed by: HOSPITALIST

## 2017-01-26 RX ORDER — FERROUS SULFATE 325(65) MG
325 TABLET ORAL 2 TIMES DAILY WITH MEALS
Status: DISCONTINUED | OUTPATIENT
Start: 2017-01-26 | End: 2017-01-27 | Stop reason: HOSPADM

## 2017-01-26 RX ADMIN — FLUTICASONE PROPIONATE 2 SPRAY: 50 SPRAY, METERED NASAL at 08:58

## 2017-01-26 RX ADMIN — DOCUSATE SODIUM -SENNOSIDES 2 TABLET: 50; 8.6 TABLET, COATED ORAL at 20:51

## 2017-01-26 RX ADMIN — LOSARTAN POTASSIUM 50 MG: 50 TABLET, FILM COATED ORAL at 08:58

## 2017-01-26 RX ADMIN — POLYETHYLENE GLYCOL 3350 17 G: 17 POWDER, FOR SOLUTION ORAL at 08:59

## 2017-01-26 RX ADMIN — CEFTRIAXONE SODIUM 2 G: 2 INJECTION, SOLUTION INTRAVENOUS at 08:58

## 2017-01-26 RX ADMIN — ASPIRIN 81 MG: 81 TABLET, CHEWABLE ORAL at 08:58

## 2017-01-26 RX ADMIN — FERROUS SULFATE TAB 325 MG (65 MG ELEMENTAL FE) 325 MG: 325 (65 FE) TAB at 12:24

## 2017-01-26 RX ADMIN — ENOXAPARIN SODIUM 40 MG: 40 INJECTION SUBCUTANEOUS at 08:59

## 2017-01-26 RX ADMIN — CETIRIZINE HYDROCHLORIDE 10 MG: 10 TABLET, FILM COATED ORAL at 08:58

## 2017-01-26 RX ADMIN — PAROXETINE HYDROCHLORIDE HEMIHYDRATE 20 MG: 20 TABLET, FILM COATED ORAL at 07:24

## 2017-01-26 RX ADMIN — ISOSORBIDE DINITRATE 30 MG: 30 TABLET ORAL at 08:58

## 2017-01-26 RX ADMIN — HYDROCODONE BITARTRATE AND ACETAMINOPHEN 1 TABLET: 5; 325 TABLET ORAL at 12:24

## 2017-01-26 RX ADMIN — ATORVASTATIN CALCIUM 40 MG: 40 TABLET, FILM COATED ORAL at 08:58

## 2017-01-26 RX ADMIN — AMLODIPINE BESYLATE 2.5 MG: 2.5 TABLET ORAL at 08:58

## 2017-01-26 RX ADMIN — ISOSORBIDE DINITRATE 30 MG: 30 TABLET ORAL at 17:00

## 2017-01-26 RX ADMIN — HYDROCODONE BITARTRATE AND ACETAMINOPHEN 1 TABLET: 5; 325 TABLET ORAL at 20:51

## 2017-01-26 RX ADMIN — FERROUS SULFATE TAB 325 MG (65 MG ELEMENTAL FE) 325 MG: 325 (65 FE) TAB at 17:01

## 2017-01-26 RX ADMIN — FUROSEMIDE 20 MG: 20 TABLET ORAL at 08:58

## 2017-01-26 RX ADMIN — BENZONATATE 100 MG: 100 CAPSULE ORAL at 18:34

## 2017-01-26 RX ADMIN — MONTELUKAST 10 MG: 10 TABLET, FILM COATED ORAL at 20:51

## 2017-01-27 VITALS
HEIGHT: 69 IN | BODY MASS INDEX: 26.66 KG/M2 | DIASTOLIC BLOOD PRESSURE: 63 MMHG | WEIGHT: 180 LBS | HEART RATE: 77 BPM | OXYGEN SATURATION: 90 % | SYSTOLIC BLOOD PRESSURE: 107 MMHG | RESPIRATION RATE: 16 BRPM | TEMPERATURE: 97.9 F

## 2017-01-27 LAB
BACTERIA SPEC AEROBE CULT: ABNORMAL
CREAT BLD-MCNC: 0.68 MG/DL (ref 0.57–1)
DEPRECATED RDW RBC AUTO: 49.5 FL (ref 37–54)
ERYTHROCYTE [DISTWIDTH] IN BLOOD BY AUTOMATED COUNT: 17.4 % (ref 11.7–13)
GFR SERPL CREATININE-BSD FRML MDRD: 91 ML/MIN/1.73
HCT VFR BLD AUTO: 28.6 % (ref 35.6–45.5)
HGB BLD-MCNC: 8.2 G/DL (ref 11.9–15.5)
MCH RBC QN AUTO: 22 PG (ref 26.9–32)
MCHC RBC AUTO-ENTMCNC: 28.7 G/DL (ref 32.4–36.3)
MCV RBC AUTO: 76.9 FL (ref 80.5–98.2)
PLATELET # BLD AUTO: 333 10*3/MM3 (ref 140–500)
PMV BLD AUTO: 8.8 FL (ref 6–12)
RBC # BLD AUTO: 3.72 10*6/MM3 (ref 3.9–5.2)
WBC NRBC COR # BLD: 6.98 10*3/MM3 (ref 4.5–10.7)

## 2017-01-27 PROCEDURE — 25010000003 CEFTRIAXONE PER 250 MG: Performed by: INTERNAL MEDICINE

## 2017-01-27 PROCEDURE — 82565 ASSAY OF CREATININE: CPT | Performed by: INTERNAL MEDICINE

## 2017-01-27 PROCEDURE — G0008 ADMIN INFLUENZA VIRUS VAC: HCPCS | Performed by: HOSPITALIST

## 2017-01-27 PROCEDURE — 99232 SBSQ HOSP IP/OBS MODERATE 35: CPT | Performed by: INTERNAL MEDICINE

## 2017-01-27 PROCEDURE — 85027 COMPLETE CBC AUTOMATED: CPT | Performed by: INTERNAL MEDICINE

## 2017-01-27 PROCEDURE — 90674 CCIIV4 VAC NO PRSV 0.5 ML IM: CPT | Performed by: HOSPITALIST

## 2017-01-27 PROCEDURE — 25010000002 ENOXAPARIN PER 10 MG: Performed by: HOSPITALIST

## 2017-01-27 PROCEDURE — 25010000004 INFLUENZA VAC SPLIT QUAD 0.5 ML SUSPENSION PREFILLED SYRINGE: Performed by: HOSPITALIST

## 2017-01-27 RX ORDER — SENNA AND DOCUSATE SODIUM 50; 8.6 MG/1; MG/1
2 TABLET, FILM COATED ORAL NIGHTLY
Qty: 60 TABLET | Refills: 0 | Status: SHIPPED | OUTPATIENT
Start: 2017-01-27 | End: 2017-04-04

## 2017-01-27 RX ORDER — POLYETHYLENE GLYCOL 3350 17 G/17G
17 POWDER, FOR SOLUTION ORAL DAILY
Qty: 30 EACH | Refills: 0 | Status: SHIPPED | OUTPATIENT
Start: 2017-01-27

## 2017-01-27 RX ORDER — FERROUS SULFATE 325(65) MG
325 TABLET ORAL 2 TIMES DAILY WITH MEALS
Qty: 60 TABLET | Refills: 2 | Status: SHIPPED | OUTPATIENT
Start: 2017-01-27 | End: 2017-04-04

## 2017-01-27 RX ORDER — LEVOFLOXACIN 750 MG/1
750 TABLET ORAL EVERY 24 HOURS
Status: DISCONTINUED | OUTPATIENT
Start: 2017-01-28 | End: 2017-01-27

## 2017-01-27 RX ORDER — LEVOFLOXACIN 750 MG/1
750 TABLET ORAL EVERY 24 HOURS
Status: DISCONTINUED | OUTPATIENT
Start: 2017-01-28 | End: 2017-01-27 | Stop reason: HOSPADM

## 2017-01-27 RX ORDER — LEVOFLOXACIN 750 MG/1
750 TABLET ORAL EVERY 24 HOURS
Qty: 13 TABLET | Refills: 0 | Status: SHIPPED | OUTPATIENT
Start: 2017-01-28 | End: 2017-02-10

## 2017-01-27 RX ADMIN — PAROXETINE HYDROCHLORIDE HEMIHYDRATE 20 MG: 20 TABLET, FILM COATED ORAL at 08:00

## 2017-01-27 RX ADMIN — INFLUENZA A VIRUS A/CALIFORNIA/7/2009 X-179A (H1N1) ANTIGEN (FORMALDEHYDE INACTIVATED), INFLUENZA A VIRUS A/HONG KONG/4801/2014 X-263B (H3N2) ANTIGEN (FORMALDEHYDE INACTIVATED), INFLUENZA B VIRUS B/PHUKET/3073/2013 ANTIGEN (FORMALDEHYDE INACTIVATED), AND INFLUENZA B VIRUS B/BRISBANE/60/2008 ANTIGEN (FORMALDEHYDE INACTIVATED) 0.5 ML: 15; 15; 15; 15 INJECTION, SUSPENSION INTRAMUSCULAR at 11:24

## 2017-01-27 RX ADMIN — LOSARTAN POTASSIUM 50 MG: 50 TABLET, FILM COATED ORAL at 08:00

## 2017-01-27 RX ADMIN — FERROUS SULFATE TAB 325 MG (65 MG ELEMENTAL FE) 325 MG: 325 (65 FE) TAB at 08:00

## 2017-01-27 RX ADMIN — ENOXAPARIN SODIUM 40 MG: 40 INJECTION SUBCUTANEOUS at 08:00

## 2017-01-27 RX ADMIN — HYDROCODONE BITARTRATE AND ACETAMINOPHEN 1 TABLET: 5; 325 TABLET ORAL at 09:12

## 2017-01-27 RX ADMIN — AMLODIPINE BESYLATE 2.5 MG: 2.5 TABLET ORAL at 08:00

## 2017-01-27 RX ADMIN — ATORVASTATIN CALCIUM 40 MG: 40 TABLET, FILM COATED ORAL at 08:00

## 2017-01-27 RX ADMIN — CETIRIZINE HYDROCHLORIDE 10 MG: 10 TABLET, FILM COATED ORAL at 08:00

## 2017-01-27 RX ADMIN — FUROSEMIDE 20 MG: 20 TABLET ORAL at 08:00

## 2017-01-27 RX ADMIN — FLUTICASONE PROPIONATE 2 SPRAY: 50 SPRAY, METERED NASAL at 08:14

## 2017-01-27 RX ADMIN — CEFTRIAXONE SODIUM 2 G: 2 INJECTION, SOLUTION INTRAVENOUS at 08:04

## 2017-01-27 RX ADMIN — ASPIRIN 81 MG: 81 TABLET, CHEWABLE ORAL at 08:00

## 2017-01-27 RX ADMIN — POLYETHYLENE GLYCOL 3350 17 G: 17 POWDER, FOR SOLUTION ORAL at 08:00

## 2017-01-27 RX ADMIN — BENZONATATE 100 MG: 100 CAPSULE ORAL at 09:12

## 2017-01-27 RX ADMIN — ISOSORBIDE DINITRATE 30 MG: 30 TABLET ORAL at 08:00

## 2017-01-28 LAB — BACTERIA SPEC AEROBE CULT: NORMAL

## 2017-01-30 LAB
BACTERIA SPEC AEROBE CULT: NORMAL
BACTERIA SPEC AEROBE CULT: NORMAL

## 2017-02-03 ENCOUNTER — LAB (OUTPATIENT)
Dept: LAB | Facility: HOSPITAL | Age: 52
End: 2017-02-03

## 2017-02-03 ENCOUNTER — OFFICE VISIT (OUTPATIENT)
Dept: CARDIOLOGY | Facility: CLINIC | Age: 52
End: 2017-02-03

## 2017-02-03 VITALS
HEIGHT: 69 IN | DIASTOLIC BLOOD PRESSURE: 76 MMHG | WEIGHT: 177 LBS | BODY MASS INDEX: 26.22 KG/M2 | SYSTOLIC BLOOD PRESSURE: 112 MMHG | HEART RATE: 75 BPM

## 2017-02-03 DIAGNOSIS — D50.0 IRON DEFICIENCY ANEMIA DUE TO CHRONIC BLOOD LOSS: ICD-10-CM

## 2017-02-03 DIAGNOSIS — R05.9 COUGH: Chronic | ICD-10-CM

## 2017-02-03 DIAGNOSIS — I20.1 CORONARY ARTERY SPASM (HCC): Primary | ICD-10-CM

## 2017-02-03 DIAGNOSIS — R06.09 DYSPNEA ON EXERTION: ICD-10-CM

## 2017-02-03 DIAGNOSIS — I10 ESSENTIAL HYPERTENSION: Chronic | ICD-10-CM

## 2017-02-03 PROBLEM — N10 ACUTE PYELONEPHRITIS: Status: RESOLVED | Noted: 2017-01-26 | Resolved: 2017-02-03

## 2017-02-03 PROBLEM — D72.829 LEUKOCYTOSIS: Status: RESOLVED | Noted: 2017-01-26 | Resolved: 2017-02-03

## 2017-02-03 PROBLEM — R78.81 BACTEREMIA DUE TO GRAM-NEGATIVE BACTERIA: Status: RESOLVED | Noted: 2017-01-25 | Resolved: 2017-02-03

## 2017-02-03 PROBLEM — A41.50 SEPSIS DUE TO GRAM NEGATIVE BACTERIA (HCC): Status: RESOLVED | Noted: 2017-01-26 | Resolved: 2017-02-03

## 2017-02-03 LAB
ANISOCYTOSIS BLD QL: NORMAL
BASOPHILS # BLD AUTO: 0.02 10*3/MM3 (ref 0–0.2)
BASOPHILS NFR BLD AUTO: 0.4 % (ref 0–1.5)
DEPRECATED RDW RBC AUTO: 46.9 FL (ref 37–54)
EOSINOPHIL # BLD AUTO: 0.07 10*3/MM3 (ref 0–0.7)
EOSINOPHIL NFR BLD AUTO: 1.2 % (ref 0.3–6.2)
ERYTHROCYTE [DISTWIDTH] IN BLOOD BY AUTOMATED COUNT: 17.5 % (ref 11.7–13)
HCT VFR BLD AUTO: 32.7 % (ref 35.6–45.5)
HGB BLD-MCNC: 9.6 G/DL (ref 11.9–15.5)
HYPOCHROMIA BLD QL: NORMAL
IMM GRANULOCYTES # BLD: 0.02 10*3/MM3 (ref 0–0.03)
IMM GRANULOCYTES NFR BLD: 0.4 % (ref 0–0.5)
LYMPHOCYTES # BLD AUTO: 2.06 10*3/MM3 (ref 0.9–4.8)
LYMPHOCYTES NFR BLD AUTO: 36.1 % (ref 19.6–45.3)
MCH RBC QN AUTO: 21.5 PG (ref 26.9–32)
MCHC RBC AUTO-ENTMCNC: 29.4 G/DL (ref 32.4–36.3)
MCV RBC AUTO: 73.3 FL (ref 80.5–98.2)
MICROCYTES BLD QL: NORMAL
MONOCYTES # BLD AUTO: 0.49 10*3/MM3 (ref 0.2–1.2)
MONOCYTES NFR BLD AUTO: 8.6 % (ref 5–12)
NEUTROPHILS # BLD AUTO: 3.04 10*3/MM3 (ref 1.9–8.1)
NEUTROPHILS NFR BLD AUTO: 53.3 % (ref 42.7–76)
PLAT MORPH BLD: NORMAL
PLATELET # BLD AUTO: 408 10*3/MM3 (ref 140–500)
PMV BLD AUTO: 8.6 FL (ref 6–12)
RBC # BLD AUTO: 4.46 10*6/MM3 (ref 3.9–5.2)
WBC MORPH BLD: NORMAL
WBC NRBC COR # BLD: 5.7 10*3/MM3 (ref 4.5–10.7)

## 2017-02-03 PROCEDURE — 99204 OFFICE O/P NEW MOD 45 MIN: CPT | Performed by: INTERNAL MEDICINE

## 2017-02-03 PROCEDURE — 85025 COMPLETE CBC W/AUTO DIFF WBC: CPT

## 2017-02-03 PROCEDURE — 93000 ELECTROCARDIOGRAM COMPLETE: CPT | Performed by: INTERNAL MEDICINE

## 2017-02-03 PROCEDURE — 36415 COLL VENOUS BLD VENIPUNCTURE: CPT

## 2017-02-03 PROCEDURE — 85007 BL SMEAR W/DIFF WBC COUNT: CPT

## 2017-02-03 RX ORDER — ISOSORBIDE MONONITRATE 30 MG/1
30 TABLET, EXTENDED RELEASE ORAL DAILY
Qty: 30 TABLET | Refills: 6 | Status: SHIPPED | OUTPATIENT
Start: 2017-02-03 | End: 2017-11-13 | Stop reason: SDUPTHER

## 2017-02-03 RX ORDER — AMLODIPINE BESYLATE 5 MG/1
5 TABLET ORAL DAILY
Qty: 30 TABLET | Refills: 6 | COMMUNITY
Start: 2017-02-03 | End: 2017-02-14

## 2017-02-03 NOTE — PROGRESS NOTES
Date of Office Visit: 2017  Encounter Provider: Aguilar Campoverde MD  Place of Service: [facility] CARDIOLOGY  Patient Name: Susy Sierra  :1965    Chief Complaint   Patient presents with   • Chest Pain   :     HPI: Susy Sierra is a 51 y.o. female who presents today to \[facility]\"" care.  She was seen by a cardiologist in  for dyspnea and had a normal echo.  In early , she developed progressively worsening exertional dyspnea, as well as seconds long chest pain.  An echo was normal (EF 60%, grade I diastolic dysfunction, mild RV enlargement, and mild MR).  A nuclear stress test was reportedly negative, but stated that her LVEF was lower than that seen on the echo.  As her symptoms persisted, she underwent cardiac catheterization in 3/2016.  Her coronaries were normal, but there was some catheter induced vasospasm noted in the ostial RCA.  She was started on amlodipine and isosorbide.  Her symptoms improved somewhat but did not resolve.  Again, her chest pain is fleeting and nonexertional.  She continues to have exertional dyspnea.    She was hospitalized last week with gram negative bacteremia and pyelonephritis.  She was noted to be significantly anemia, and had a leukocytosis.  She was asked to have a CBC done one week later.      She has a chronic cough and wonders if it could be due to any of her medications.      Past Medical History   Diagnosis Date   • Acute pyelonephritis      2017   • Asthma    • Constipation    • Coronary artery spasm      cath 3/2016 with catheter induced spasm of ostial RCA, ? if she has actual coronary vasospasm at other times   • Essential hypertension    • Hypertension    • Iron deficiency anemia due to chronic blood loss    • Seasonal allergies    • Sepsis due to Gram negative bacteria      due to pyelo       Past Surgical History   Procedure Laterality Date   • Mandible surgery     • Carpal tunnel release     • Ovarian cyst removal      • Hysterectomy     • Cholecystectomy     • Shoulder surgery         Social History     Social History   • Marital status:      Spouse name: N/A   • Number of children: N/A   • Years of education: N/A     Occupational History   • Not on file.     Social History Main Topics   • Smoking status: Never Smoker   • Smokeless tobacco: Never Used   • Alcohol use 0.6 oz/week     1 Glasses of wine per week      Comment: once per wk    • Drug use: No   • Sexual activity: Defer     Other Topics Concern   • Not on file     Social History Narrative       History reviewed. No pertinent family history.    Review of Systems   Constitution: Positive for malaise/fatigue.   Cardiovascular: Positive for chest pain and dyspnea on exertion.   All other systems reviewed and are negative.      Allergies   Allergen Reactions   • Motrin [Ibuprofen]          Current Outpatient Prescriptions:   •  albuterol (PROVENTIL HFA;VENTOLIN HFA) 108 (90 BASE) MCG/ACT inhaler, Inhale 2 puffs Every 4 (Four) Hours As Needed for wheezing., Disp: , Rfl:   •  amLODIPine (NORVASC) 5 MG tablet, Take 1 tablet by mouth Daily., Disp: 30 tablet, Rfl: 6  •  aspirin 81 MG chewable tablet, Chew 81 mg Daily., Disp: , Rfl:   •  atorvastatin (LIPITOR) 40 MG tablet, Take 40 mg by mouth Daily., Disp: , Rfl:   •  benzonatate (TESSALON) 100 MG capsule, Take 100 mg by mouth 3 (Three) Times a Day As Needed for cough., Disp: , Rfl:   •  cetirizine (zyrTEC) 10 MG tablet, Take 10 mg by mouth Daily., Disp: , Rfl:   •  ferrous sulfate 325 (65 FE) MG tablet, Take 1 tablet by mouth 2 (Two) Times a Day With Meals., Disp: 60 tablet, Rfl: 2  •  fluticasone (FLONASE) 50 MCG/ACT nasal spray, 2 sprays into each nostril Daily., Disp: , Rfl:   •  furosemide (LASIX) 20 MG tablet, Take 20 mg by mouth Daily., Disp: , Rfl:   •  levoFLOXacin (LEVAQUIN) 750 MG tablet, Take 1 tablet by mouth Daily for 13 doses. Indications: Bacteria in the Blood, Urinary Tract Infection, Disp: 13 tablet,  "Rfl: 0  •  montelukast (SINGULAIR) 10 MG tablet, Take 10 mg by mouth Every Night., Disp: , Rfl:   •  PARoxetine (PAXIL) 20 MG tablet, Take 20 mg by mouth Every Morning., Disp: , Rfl:   •  polyethylene glycol (MIRALAX) packet, Take 17 g by mouth Daily., Disp: 30 each, Rfl: 0  •  sennosides-docusate sodium (SENOKOT-S) 8.6-50 MG tablet, Take 2 tablets by mouth Every Night., Disp: 60 tablet, Rfl: 0  •  isosorbide mononitrate (IMDUR) 30 MG 24 hr tablet, Take 1 tablet by mouth Daily., Disp: 30 tablet, Rfl: 6     Objective:     Vitals:    02/03/17 1045   BP: 112/76   Pulse: 75   Weight: 177 lb (80.3 kg)   Height: 69\" (175.3 cm)     Body mass index is 26.14 kg/(m^2).    Physical Exam   Constitutional: She is oriented to person, place, and time. She appears well-developed and well-nourished.   HENT:   Head: Normocephalic.   Nose: Nose normal.   Mouth/Throat: Oropharynx is clear and moist.   Eyes: Conjunctivae and EOM are normal. Pupils are equal, round, and reactive to light.   Neck: Normal range of motion. No JVD present.   Cardiovascular: Normal rate, regular rhythm, normal heart sounds and intact distal pulses.    No murmur heard.  Pulmonary/Chest: Effort normal and breath sounds normal.   Abdominal: Soft. She exhibits no mass. There is no tenderness.   Musculoskeletal: Normal range of motion. She exhibits no edema.   Lymphadenopathy:     She has no cervical adenopathy.   Neurological: She is alert and oriented to person, place, and time. No cranial nerve deficit.   Skin: Skin is warm and dry. No rash noted.   Psychiatric: She has a normal mood and affect. Her behavior is normal. Judgment and thought content normal.   Vitals reviewed.        ECG 12 Lead  Date/Time: 2/3/2017 3:35 PM  Performed by: YNES GRANDA  Authorized by: YNES GRANDA   Comparison: compared with previous ECG   Similar to previous ECG  Rhythm: sinus rhythm  Conduction: conduction normal  ST Segments: ST segments normal  T Waves: T waves normal  Other: " no other findings  Clinical impression: normal ECG              Assessment:       Diagnosis Plan   1. Coronary artery spasm     2. Cough     3. Dyspnea on exertion  Adult Transthoracic Echo Complete With Contrast   4. Essential hypertension     5. Iron deficiency anemia due to chronic blood loss  CBC & Differential          Plan:       This is a questionable diagnosis.  Many people will have catheter induced vasospasm during cath, but do not have spontaneous coronary vasospasm at other times.  Her seconds long chest pain is non-cardiac (even pain from spasm lasts longer than this). Her chronic exertional dyspnea cannot be explained by this diagnosis either.  She seems to feel better on the combination of isosorbide and amlodipine.    I don't suspect that her cough is due to losartan, although it has been known to happen.    I have stopped her losartan and increased her amlodipine to 5mg daily.  I changed her isosorbide dinitrite to mononitrate, 30 mg daily, for ease of dosing.      I am going to recheck an echocardiogram given her persistent dyspnea, but I suspect it is due to anemia.  I rechecked a CBC today; her leukocytosis has resolved, and her anemia is slightly improved, although her hemoglobin is still < 10.  She is on iron.        Sincerely,       Aguilar Campoverde MD

## 2017-02-13 ENCOUNTER — HOSPITAL ENCOUNTER (OUTPATIENT)
Dept: CARDIOLOGY | Facility: HOSPITAL | Age: 52
Discharge: HOME | End: 2017-02-13
Attending: INTERNAL MEDICINE | Admitting: INTERNAL MEDICINE

## 2017-02-13 VITALS
HEIGHT: 69 IN | DIASTOLIC BLOOD PRESSURE: 40 MMHG | BODY MASS INDEX: 26.22 KG/M2 | SYSTOLIC BLOOD PRESSURE: 114 MMHG | WEIGHT: 177 LBS | HEART RATE: 66 BPM

## 2017-02-13 DIAGNOSIS — R06.09 DYSPNEA ON EXERTION: ICD-10-CM

## 2017-02-13 LAB
BH CV ECHO MEAS - ACS: 2 CM
BH CV ECHO MEAS - AO MAX PG (FULL): -0.11 MMHG
BH CV ECHO MEAS - AO MAX PG: 8.6 MMHG
BH CV ECHO MEAS - AO MEAN PG (FULL): 0.75 MMHG
BH CV ECHO MEAS - AO MEAN PG: 5.3 MMHG
BH CV ECHO MEAS - AO ROOT AREA: 7.5 CM^2
BH CV ECHO MEAS - AO ROOT DIAM: 3.1 CM
BH CV ECHO MEAS - AO V2 MAX: 146.6 CM/SEC
BH CV ECHO MEAS - AO V2 MEAN: 107.4 CM/SEC
BH CV ECHO MEAS - AO V2 VTI: 35.5 CM
BH CV ECHO MEAS - AVA(I,A): 2.5 CM^2
BH CV ECHO MEAS - AVA(I,D): 2.5 CM^2
BH CV ECHO MEAS - AVA(V,A): 2.9 CM^2
BH CV ECHO MEAS - AVA(V,D): 2.9 CM^2
BH CV ECHO MEAS - CONTRAST EF (2CH): 66.3 ML/M^2
BH CV ECHO MEAS - CONTRAST EF 4CH: 57.1 ML/M^2
BH CV ECHO MEAS - EDV(CUBED): 141.2 ML
BH CV ECHO MEAS - EDV(MOD-SP2): 92 ML
BH CV ECHO MEAS - EDV(MOD-SP4): 70 ML
BH CV ECHO MEAS - EDV(TEICH): 129.9 ML
BH CV ECHO MEAS - EF(CUBED): 87.5 %
BH CV ECHO MEAS - EF(MOD-SP2): 66.3 %
BH CV ECHO MEAS - EF(MOD-SP4): 57.1 %
BH CV ECHO MEAS - EF(TEICH): 81 %
BH CV ECHO MEAS - ESV(CUBED): 17.7 ML
BH CV ECHO MEAS - ESV(MOD-SP2): 31 ML
BH CV ECHO MEAS - ESV(MOD-SP4): 30 ML
BH CV ECHO MEAS - ESV(TEICH): 24.7 ML
BH CV ECHO MEAS - FS: 50 %
BH CV ECHO MEAS - IVS/LVPW: 0.92
BH CV ECHO MEAS - IVSD: 0.9 CM
BH CV ECHO MEAS - LAT PEAK E' VEL: 16 CM/SEC
BH CV ECHO MEAS - LV MASS(C)D: 178.2 GRAMS
BH CV ECHO MEAS - LV MAX PG: 8.7 MMHG
BH CV ECHO MEAS - LV MEAN PG: 4.5 MMHG
BH CV ECHO MEAS - LV V1 MAX: 147.5 CM/SEC
BH CV ECHO MEAS - LV V1 MEAN: 94.8 CM/SEC
BH CV ECHO MEAS - LV V1 VTI: 30.4 CM
BH CV ECHO MEAS - LVIDD: 5.2 CM
BH CV ECHO MEAS - LVIDS: 2.6 CM
BH CV ECHO MEAS - LVLD AP2: 7.1 CM
BH CV ECHO MEAS - LVLD AP4: 7.2 CM
BH CV ECHO MEAS - LVLS AP2: 5.7 CM
BH CV ECHO MEAS - LVLS AP4: 5.8 CM
BH CV ECHO MEAS - LVOT AREA (M): 2.8 CM^2
BH CV ECHO MEAS - LVOT AREA: 2.9 CM^2
BH CV ECHO MEAS - LVOT DIAM: 1.9 CM
BH CV ECHO MEAS - LVPWD: 0.98 CM
BH CV ECHO MEAS - MED PEAK E' VEL: 14 CM/SEC
BH CV ECHO MEAS - MR MAX PG: 25.2 MMHG
BH CV ECHO MEAS - MR MAX VEL: 251.1 CM/SEC
BH CV ECHO MEAS - MV A DUR: 0.15 SEC
BH CV ECHO MEAS - MV A MAX VEL: 93.6 CM/SEC
BH CV ECHO MEAS - MV DEC SLOPE: 559.9 CM/SEC^2
BH CV ECHO MEAS - MV DEC TIME: 0.21 SEC
BH CV ECHO MEAS - MV E MAX VEL: 122.2 CM/SEC
BH CV ECHO MEAS - MV E/A: 1.3
BH CV ECHO MEAS - MV MAX PG: 9.2 MMHG
BH CV ECHO MEAS - MV MEAN PG: 3.3 MMHG
BH CV ECHO MEAS - MV P1/2T MAX VEL: 121.7 CM/SEC
BH CV ECHO MEAS - MV P1/2T: 63.7 MSEC
BH CV ECHO MEAS - MV V2 MAX: 152 CM/SEC
BH CV ECHO MEAS - MV V2 MEAN: 83.5 CM/SEC
BH CV ECHO MEAS - MV V2 VTI: 42.6 CM
BH CV ECHO MEAS - MVA P1/2T LCG: 1.8 CM^2
BH CV ECHO MEAS - MVA(P1/2T): 3.5 CM^2
BH CV ECHO MEAS - MVA(VTI): 2.1 CM^2
BH CV ECHO MEAS - PA ACC TIME: 0.12 SEC
BH CV ECHO MEAS - PA MAX PG (FULL): 2.5 MMHG
BH CV ECHO MEAS - PA MAX PG: 5 MMHG
BH CV ECHO MEAS - PA PR(ACCEL): 25.1 MMHG
BH CV ECHO MEAS - PA V2 MAX: 111.3 CM/SEC
BH CV ECHO MEAS - PULM A REVS DUR: 0.13 SEC
BH CV ECHO MEAS - PULM A REVS VEL: 30.6 CM/SEC
BH CV ECHO MEAS - PULM DIAS VEL: 60.1 CM/SEC
BH CV ECHO MEAS - PULM S/D: 1.1
BH CV ECHO MEAS - PULM SYS VEL: 66.9 CM/SEC
BH CV ECHO MEAS - PVA(V,A): 1.7 CM^2
BH CV ECHO MEAS - PVA(V,D): 1.7 CM^2
BH CV ECHO MEAS - QP/QS: 0.48
BH CV ECHO MEAS - RAP SYSTOLE: 3 MMHG
BH CV ECHO MEAS - RV MAX PG: 2.4 MMHG
BH CV ECHO MEAS - RV MEAN PG: 1.6 MMHG
BH CV ECHO MEAS - RV V1 MAX: 78.1 CM/SEC
BH CV ECHO MEAS - RV V1 MEAN: 59.4 CM/SEC
BH CV ECHO MEAS - RV V1 VTI: 17.3 CM
BH CV ECHO MEAS - RVOT AREA: 2.4 CM^2
BH CV ECHO MEAS - RVOT DIAM: 1.8 CM
BH CV ECHO MEAS - RVSP: 20 MMHG
BH CV ECHO MEAS - SUP REN AO DIAM: 2 CM
BH CV ECHO MEAS - SV(AO): 266.9 ML
BH CV ECHO MEAS - SV(CUBED): 123.6 ML
BH CV ECHO MEAS - SV(LVOT): 87.7 ML
BH CV ECHO MEAS - SV(MOD-SP2): 61 ML
BH CV ECHO MEAS - SV(MOD-SP4): 40 ML
BH CV ECHO MEAS - SV(RVOT): 42 ML
BH CV ECHO MEAS - SV(TEICH): 105.2 ML
BH CV ECHO MEAS - TAPSE (>1.6): 2.3 CM2
BH CV ECHO MEAS - TR MAX VEL: 203.6 CM/SEC
BH CV XLRA - RV BASE: 2.6 CM
BH CV XLRA - TDI S': 14 CM/SEC
E/E' RATIO: 15
LEFT ATRIUM VOLUME INDEX: 27 ML/M2
SINUS: 2.7 CM
STJ: 2.7 CM

## 2017-02-13 PROCEDURE — 93306 TTE W/DOPPLER COMPLETE: CPT | Performed by: INTERNAL MEDICINE

## 2017-02-13 PROCEDURE — 93306 TTE W/DOPPLER COMPLETE: CPT

## 2017-02-14 RX ORDER — AMLODIPINE BESYLATE 2.5 MG/1
2.5 TABLET ORAL DAILY
COMMUNITY
Start: 2017-02-14 | End: 2017-03-24

## 2017-02-15 ENCOUNTER — OFFICE VISIT (OUTPATIENT)
Dept: GASTROENTEROLOGY | Facility: CLINIC | Age: 52
End: 2017-02-15

## 2017-02-15 VITALS
SYSTOLIC BLOOD PRESSURE: 112 MMHG | DIASTOLIC BLOOD PRESSURE: 76 MMHG | WEIGHT: 182.2 LBS | HEIGHT: 69 IN | BODY MASS INDEX: 26.98 KG/M2

## 2017-02-15 DIAGNOSIS — K59.04 CHRONIC IDIOPATHIC CONSTIPATION: ICD-10-CM

## 2017-02-15 DIAGNOSIS — D50.8 OTHER IRON DEFICIENCY ANEMIA: Primary | ICD-10-CM

## 2017-02-15 PROCEDURE — 99203 OFFICE O/P NEW LOW 30 MIN: CPT | Performed by: INTERNAL MEDICINE

## 2017-02-15 RX ORDER — SODIUM CHLORIDE 0.9 % (FLUSH) 0.9 %
1-10 SYRINGE (ML) INJECTION AS NEEDED
Status: CANCELLED | OUTPATIENT
Start: 2017-02-15

## 2017-02-15 RX ORDER — SODIUM CHLORIDE, SODIUM LACTATE, POTASSIUM CHLORIDE, CALCIUM CHLORIDE 600; 310; 30; 20 MG/100ML; MG/100ML; MG/100ML; MG/100ML
30 INJECTION, SOLUTION INTRAVENOUS CONTINUOUS
Status: CANCELLED | OUTPATIENT
Start: 2017-02-15

## 2017-02-15 NOTE — PROGRESS NOTES
"Chief Complaint   Patient presents with   • Constipation     was in hospital, was told she needs c/s       Subjective     HPI    Susy Sierra is a 51 y.o. female with a past medical history noted below who presents for evaluation of need for colonoscopy.  She was hospitalized January 24-27th, she had E coli bacteremia that was thought to be due to pyelonephritis.  She was having high fevers but no other symptoms.  She was actually soaking her sheets with night sweats.  She was treated with antibiotics and recovered  She was also found to have an anemia-- low iron and was started on FeSO4.  She denies any overt GI bleeding.  She denies a prior history of anemia.    She has never had a colonoscopy and was advised to following her hospitalization    She does have chronic constipation since the birth of her daughter 28 years ago. She has been on various OTC medications but finds that they usually stop being effective.  She will have \"pellets\" for weeks at a time.  She feels incomplete evacuation.  She is currently on miralax and Senna S and this is working well for her.      No family history of GI malignancy.  No smoking, occasional ETOH.  Works as an     Past Medical History   Diagnosis Date   • Acute pyelonephritis      1/2017   • Asthma    • Constipation    • Coronary artery spasm      cath 3/2016 with catheter induced spasm of ostial RCA, ? if she has actual coronary vasospasm at other times   • Essential hypertension    • Hypertension    • Iron deficiency anemia due to chronic blood loss    • Seasonal allergies    • Sepsis due to Gram negative bacteria      due to pyelo         Current Outpatient Prescriptions:   •  albuterol (PROVENTIL HFA;VENTOLIN HFA) 108 (90 BASE) MCG/ACT inhaler, Inhale 2 puffs Every 4 (Four) Hours As Needed for wheezing., Disp: , Rfl:   •  amLODIPine (NORVASC) 2.5 MG tablet, Take 1 tablet by mouth Daily., Disp: , Rfl:   •  aspirin 81 MG chewable tablet, Chew 81 mg Daily., Disp: , " Rfl:   •  atorvastatin (LIPITOR) 40 MG tablet, Take 40 mg by mouth Daily., Disp: , Rfl:   •  benzonatate (TESSALON) 100 MG capsule, Take 100 mg by mouth 3 (Three) Times a Day As Needed for cough., Disp: , Rfl:   •  cetirizine (zyrTEC) 10 MG tablet, Take 10 mg by mouth Daily., Disp: , Rfl:   •  ferrous sulfate 325 (65 FE) MG tablet, Take 1 tablet by mouth 2 (Two) Times a Day With Meals., Disp: 60 tablet, Rfl: 2  •  fluticasone (FLONASE) 50 MCG/ACT nasal spray, 2 sprays into each nostril Daily., Disp: , Rfl:   •  furosemide (LASIX) 20 MG tablet, Take 20 mg by mouth Daily., Disp: , Rfl:   •  isosorbide mononitrate (IMDUR) 30 MG 24 hr tablet, Take 1 tablet by mouth Daily., Disp: 30 tablet, Rfl: 6  •  montelukast (SINGULAIR) 10 MG tablet, Take 10 mg by mouth Every Night., Disp: , Rfl:   •  PARoxetine (PAXIL) 20 MG tablet, Take 20 mg by mouth Every Morning., Disp: , Rfl:   •  polyethylene glycol (MIRALAX) packet, Take 17 g by mouth Daily., Disp: 30 each, Rfl: 0  •  sennosides-docusate sodium (SENOKOT-S) 8.6-50 MG tablet, Take 2 tablets by mouth Every Night., Disp: 60 tablet, Rfl: 0    Allergies   Allergen Reactions   • Motrin [Ibuprofen]        Social History     Social History   • Marital status:      Spouse name: N/A   • Number of children: N/A   • Years of education: N/A     Occupational History   • Not on file.     Social History Main Topics   • Smoking status: Never Smoker   • Smokeless tobacco: Never Used   • Alcohol use 0.6 oz/week     1 Glasses of wine per week      Comment: once per wk    • Drug use: No   • Sexual activity: Defer     Other Topics Concern   • Not on file     Social History Narrative       History reviewed. No pertinent family history.    Review of Systems   Constitutional: Negative for activity change, appetite change and fatigue.   HENT: Negative for congestion, sore throat and trouble swallowing.    Eyes: Negative.    Respiratory: Negative.    Cardiovascular: Positive for leg swelling.         Ankle edema   Gastrointestinal: Positive for constipation. Negative for abdominal distention, abdominal pain, blood in stool, nausea and vomiting.   Endocrine: Negative for cold intolerance and heat intolerance.   Genitourinary: Negative for difficulty urinating, dysuria, frequency and hematuria.   Musculoskeletal: Negative for arthralgias, back pain and myalgias.   Skin: Negative.    Hematological: Negative for adenopathy. Does not bruise/bleed easily.   All other systems reviewed and are negative.      Objective     Vitals:    02/15/17 1607   BP: 112/76     Last 2 weights    02/15/17  1607   Weight: 182 lb 3.2 oz (82.6 kg)     Body mass index is 26.91 kg/(m^2).    Physical Exam   Constitutional: She is oriented to person, place, and time. She appears well-developed and well-nourished. No distress.   HENT:   Head: Normocephalic and atraumatic.   Right Ear: External ear normal.   Left Ear: External ear normal.   Nose: Nose normal.   Mouth/Throat: Oropharynx is clear and moist.   Eyes: Conjunctivae and EOM are normal. Right eye exhibits no discharge. Left eye exhibits no discharge. No scleral icterus.   Neck: Normal range of motion. Neck supple. No thyromegaly present.   No supraclavicular adenopathy   Cardiovascular: Normal rate, regular rhythm, normal heart sounds and intact distal pulses.  Exam reveals no gallop.    No murmur heard.  No lower extremity edema   Pulmonary/Chest: Effort normal and breath sounds normal. No respiratory distress. She has no wheezes.   Abdominal: Soft. Normal appearance and bowel sounds are normal. She exhibits no distension and no mass. There is no hepatosplenomegaly. There is no tenderness. There is no rigidity, no rebound and no guarding.   Genitourinary:   Genitourinary Comments: Rectal exam deferred   Musculoskeletal: Normal range of motion. She exhibits edema. She exhibits no tenderness.   No atrophy of upper or lower extremities.  Normal digits and nails of both  hands.  Bilat ankle edema 1+   Lymphadenopathy:     She has no cervical adenopathy.   Neurological: She is alert and oriented to person, place, and time. She displays no atrophy. Coordination normal.   Skin: Skin is warm and dry. No rash noted. She is not diaphoretic. No erythema.   Psychiatric: She has a normal mood and affect. Her behavior is normal. Judgment and thought content normal.   Vitals reviewed.      WBC   Date Value Ref Range Status   02/03/2017 5.70 4.50 - 10.70 10*3/mm3 Final     RBC   Date Value Ref Range Status   02/03/2017 4.46 3.90 - 5.20 10*6/mm3 Final     HEMOGLOBIN   Date Value Ref Range Status   02/03/2017 9.6 (L) 11.9 - 15.5 g/dL Final     HEMATOCRIT   Date Value Ref Range Status   02/03/2017 32.7 (L) 35.6 - 45.5 % Final     MCV   Date Value Ref Range Status   02/03/2017 73.3 (L) 80.5 - 98.2 fL Final     MCH   Date Value Ref Range Status   02/03/2017 21.5 (L) 26.9 - 32.0 pg Final     MCHC   Date Value Ref Range Status   02/03/2017 29.4 (L) 32.4 - 36.3 g/dL Final     RDW   Date Value Ref Range Status   02/03/2017 17.5 (H) 11.7 - 13.0 % Final     RDW-SD   Date Value Ref Range Status   02/03/2017 46.9 37.0 - 54.0 fl Final     MPV   Date Value Ref Range Status   02/03/2017 8.6 6.0 - 12.0 fL Final     PLATELETS   Date Value Ref Range Status   02/03/2017 408 140 - 500 10*3/mm3 Final     NEUTROPHIL %   Date Value Ref Range Status   02/03/2017 53.3 42.7 - 76.0 % Final     LYMPHOCYTE %   Date Value Ref Range Status   02/03/2017 36.1 19.6 - 45.3 % Final     MONOCYTE %   Date Value Ref Range Status   02/03/2017 8.6 5.0 - 12.0 % Final     EOSINOPHIL %   Date Value Ref Range Status   02/03/2017 1.2 0.3 - 6.2 % Final     BASOPHIL %   Date Value Ref Range Status   02/03/2017 0.4 0.0 - 1.5 % Final     IMMATURE GRANS %   Date Value Ref Range Status   02/03/2017 0.4 0.0 - 0.5 % Final     NEUTROPHILS, ABSOLUTE   Date Value Ref Range Status   02/03/2017 3.04 1.90 - 8.10 10*3/mm3 Final     LYMPHOCYTES, ABSOLUTE    Date Value Ref Range Status   02/03/2017 2.06 0.90 - 4.80 10*3/mm3 Final     MONOCYTES, ABSOLUTE   Date Value Ref Range Status   02/03/2017 0.49 0.20 - 1.20 10*3/mm3 Final     EOSINOPHILS, ABSOLUTE   Date Value Ref Range Status   02/03/2017 0.07 0.00 - 0.70 10*3/mm3 Final     BASOPHILS, ABSOLUTE   Date Value Ref Range Status   02/03/2017 0.02 0.00 - 0.20 10*3/mm3 Final     IMMATURE GRANS, ABSOLUTE   Date Value Ref Range Status   02/03/2017 0.02 0.00 - 0.03 10*3/mm3 Final     NRBC   Date Value Ref Range Status   01/25/2017 0.0 0.0 - 0.0 /100 WBC Final       GLUCOSE   Date Value Ref Range Status   01/25/2017 102 (H) 65 - 99 mg/dL Final     SODIUM   Date Value Ref Range Status   01/25/2017 141 136 - 145 mmol/L Final     POTASSIUM   Date Value Ref Range Status   01/25/2017 3.9 3.5 - 5.2 mmol/L Final     CO2   Date Value Ref Range Status   01/25/2017 24.3 22.0 - 29.0 mmol/L Final     CHLORIDE   Date Value Ref Range Status   01/25/2017 105 98 - 107 mmol/L Final     ANION GAP   Date Value Ref Range Status   01/25/2017 11.7 mmol/L Final     CREATININE   Date Value Ref Range Status   01/27/2017 0.68 0.57 - 1.00 mg/dL Final     BUN   Date Value Ref Range Status   01/25/2017 12 6 - 20 mg/dL Final     BUN/CREATININE RATIO   Date Value Ref Range Status   01/25/2017 18.5 7.0 - 25.0 Final     CALCIUM   Date Value Ref Range Status   01/25/2017 8.4 (L) 8.6 - 10.5 mg/dL Final     EGFR NON  AMER   Date Value Ref Range Status   01/27/2017 91 >60 mL/min/1.73 Final     ALKALINE PHOSPHATASE   Date Value Ref Range Status   01/25/2017 101 39 - 117 U/L Final     TOTAL PROTEIN   Date Value Ref Range Status   01/25/2017 6.7 6.0 - 8.5 g/dL Final     ALT (SGPT)   Date Value Ref Range Status   01/25/2017 44 (H) 1 - 33 U/L Final     AST (SGOT)   Date Value Ref Range Status   01/25/2017 28 1 - 32 U/L Final     TOTAL BILIRUBIN   Date Value Ref Range Status   01/25/2017 0.3 0.1 - 1.2 mg/dL Final     ALBUMIN   Date Value Ref Range Status    01/25/2017 2.90 (L) 3.50 - 5.20 g/dL Final     GLOBULIN   Date Value Ref Range Status   01/25/2017 3.8 gm/dL Final     A/G RATIO   Date Value Ref Range Status   01/25/2017 0.8 g/dL Final         Imaging Results (last 7 days)     ** No results found for the last 168 hours. **            No notes on file    Assessment/Plan    iron deficiency anemia: Hemoglobin is 8-9 with low iron levels and microcytosis.  Her ferritin was in the 100s at her last hospitalization; I believe this is due to inflammation from her ongoing infection at that time.  She has no overt GI bleeding.  She is on iron supplementation.    Constipation: Chronic issue for her.  She has been through a number of OTC medications.  Currently on a regimen that is working for her with MiraLAX and Senokot    Plan:  EGD and colonoscopy for further evaluation of iron deficiency anemia  Advised to continue MiraLAX daily to twice daily and using Senokot-S only as needed for constipation issues  She is to continue iron    Susy was seen today for constipation.    Diagnoses and all orders for this visit:    Other iron deficiency anemia  -     Case Request; Standing  -     Implement Anesthesia Orders Day of Procedure; Standing  -     Obtain Informed Consent; Standing  -     Verify Informed Consent; Standing  -     Insert Peripheral IV; Standing  -     Saline Lock & Maintain IV Access; Standing  -     sodium chloride 0.9 % flush 1-10 mL; Infuse 1-10 mL into a venous catheter As Needed for line care.  -     lactated ringers infusion; Infuse 30 mL/hr into a venous catheter Continuous.  -     Case Request    Chronic idiopathic constipation        I have discussed the above plan with the patient.  They verbalize understanding and are in agreement with the plan.  They have been advised to contact the office for any questions, concerns, or changes related to their health.    EMR Dragon/Transcription disclaimer:       Much of this encounter note is an electronic  transcription/translation of spoken language to printed text. The electronic translation of spoken language may permit erroneous, or at times, nonsensical words or phrases to be inadvertently transcribed; Although I have reviewed the note for such errors, some may still exist.

## 2017-02-15 NOTE — PATIENT INSTRUCTIONS
Schedule the EGD and colonoscopy    Continue the miralax daily to twice daily and keep the senokot for as needed severe constipation.    For any additional questions, concerns or changes to your condition after today's office visit please contact the office at [phone].

## 2017-03-22 ENCOUNTER — TELEPHONE (OUTPATIENT)
Dept: CARDIOLOGY | Facility: CLINIC | Age: 52
End: 2017-03-22

## 2017-03-22 NOTE — TELEPHONE ENCOUNTER
Tried calling pt to schedule appt, but had to leave a v/m.    Offered appt for tomorrow 03/24/17 with CARLTON Shelton at 0920.  I also sent [employer] message regarding appt.

## 2017-03-23 ENCOUNTER — HOSPITAL ENCOUNTER (OUTPATIENT)
Dept: CARDIOLOGY | Facility: HOSPITAL | Age: 52
End: 2017-03-23

## 2017-03-23 ENCOUNTER — OFFICE VISIT (OUTPATIENT)
Dept: CARDIOLOGY | Facility: CLINIC | Age: 52
End: 2017-03-23

## 2017-03-23 ENCOUNTER — HOSPITAL ENCOUNTER (OUTPATIENT)
Dept: CARDIOLOGY | Facility: HOSPITAL | Age: 52
Discharge: HOME | End: 2017-03-23
Admitting: NURSE PRACTITIONER

## 2017-03-23 ENCOUNTER — APPOINTMENT (OUTPATIENT)
Dept: LAB | Facility: HOSPITAL | Age: 52
End: 2017-03-23

## 2017-03-23 ENCOUNTER — TELEPHONE (OUTPATIENT)
Dept: CARDIOLOGY | Facility: HOSPITAL | Age: 52
End: 2017-03-23

## 2017-03-23 VITALS
BODY MASS INDEX: 28.35 KG/M2 | WEIGHT: 191.4 LBS | DIASTOLIC BLOOD PRESSURE: 80 MMHG | SYSTOLIC BLOOD PRESSURE: 124 MMHG | HEART RATE: 64 BPM | HEIGHT: 69 IN

## 2017-03-23 DIAGNOSIS — D50.0 IRON DEFICIENCY ANEMIA DUE TO CHRONIC BLOOD LOSS: ICD-10-CM

## 2017-03-23 DIAGNOSIS — R63.5 WEIGHT GAIN: ICD-10-CM

## 2017-03-23 DIAGNOSIS — R60.0 LOCALIZED EDEMA: Primary | ICD-10-CM

## 2017-03-23 DIAGNOSIS — R60.0 BILATERAL EDEMA OF LOWER EXTREMITY: Primary | ICD-10-CM

## 2017-03-23 DIAGNOSIS — I20.1 CORONARY ARTERY SPASM (HCC): ICD-10-CM

## 2017-03-23 DIAGNOSIS — I10 ESSENTIAL HYPERTENSION: Chronic | ICD-10-CM

## 2017-03-23 DIAGNOSIS — R06.02 SHORTNESS OF BREATH: ICD-10-CM

## 2017-03-23 LAB
ALBUMIN SERPL-MCNC: 4 G/DL (ref 3.5–5.2)
ALBUMIN/GLOB SERPL: 1.3 G/DL
ALP SERPL-CCNC: 81 U/L (ref 39–117)
ALT SERPL W P-5'-P-CCNC: 24 U/L (ref 1–33)
ANION GAP SERPL CALCULATED.3IONS-SCNC: 11.5 MMOL/L
AST SERPL-CCNC: 19 U/L (ref 1–32)
BASOPHILS # BLD AUTO: 0.03 10*3/MM3 (ref 0–0.2)
BASOPHILS NFR BLD AUTO: 0.5 % (ref 0–1.5)
BH CV LOWER VASCULAR LEFT COMMON FEMORAL AUGMENT: NORMAL
BH CV LOWER VASCULAR LEFT COMMON FEMORAL COMPETENT: NORMAL
BH CV LOWER VASCULAR LEFT COMMON FEMORAL COMPRESS: NORMAL
BH CV LOWER VASCULAR LEFT COMMON FEMORAL PHASIC: NORMAL
BH CV LOWER VASCULAR LEFT COMMON FEMORAL SPONT: NORMAL
BH CV LOWER VASCULAR LEFT DISTAL FEMORAL COMPRESS: NORMAL
BH CV LOWER VASCULAR LEFT MID FEMORAL AUGMENT: NORMAL
BH CV LOWER VASCULAR LEFT MID FEMORAL COMPETENT: NORMAL
BH CV LOWER VASCULAR LEFT MID FEMORAL COMPRESS: NORMAL
BH CV LOWER VASCULAR LEFT MID FEMORAL PHASIC: NORMAL
BH CV LOWER VASCULAR LEFT MID FEMORAL SPONT: NORMAL
BH CV LOWER VASCULAR LEFT PERONEAL COMPRESS: NORMAL
BH CV LOWER VASCULAR LEFT POPLITEAL AUGMENT: NORMAL
BH CV LOWER VASCULAR LEFT POPLITEAL COMPETENT: NORMAL
BH CV LOWER VASCULAR LEFT POPLITEAL COMPRESS: NORMAL
BH CV LOWER VASCULAR LEFT POPLITEAL PHASIC: NORMAL
BH CV LOWER VASCULAR LEFT POPLITEAL SPONT: NORMAL
BH CV LOWER VASCULAR LEFT POSTERIOR TIBIAL COMPRESS: NORMAL
BH CV LOWER VASCULAR LEFT PROFUNDA FEMORAL AUGMENT: NORMAL
BH CV LOWER VASCULAR LEFT PROFUNDA FEMORAL COMPETENT: NORMAL
BH CV LOWER VASCULAR LEFT PROFUNDA FEMORAL COMPRESS: NORMAL
BH CV LOWER VASCULAR LEFT PROFUNDA FEMORAL PHASIC: NORMAL
BH CV LOWER VASCULAR LEFT PROFUNDA FEMORAL SPONT: NORMAL
BH CV LOWER VASCULAR LEFT PROXIMAL FEMORAL COMPRESS: NORMAL
BH CV LOWER VASCULAR LEFT SAPHENOFEMORAL JUNCTION AUGMENT: NORMAL
BH CV LOWER VASCULAR LEFT SAPHENOFEMORAL JUNCTION COMPETENT: NORMAL
BH CV LOWER VASCULAR LEFT SAPHENOFEMORAL JUNCTION COMPRESS: NORMAL
BH CV LOWER VASCULAR LEFT SAPHENOFEMORAL JUNCTION PHASIC: NORMAL
BH CV LOWER VASCULAR LEFT SAPHENOFEMORAL JUNCTION SPONT: NORMAL
BH CV LOWER VASCULAR RIGHT COMMON FEMORAL AUGMENT: NORMAL
BH CV LOWER VASCULAR RIGHT COMMON FEMORAL COMPETENT: NORMAL
BH CV LOWER VASCULAR RIGHT COMMON FEMORAL COMPRESS: NORMAL
BH CV LOWER VASCULAR RIGHT COMMON FEMORAL PHASIC: NORMAL
BH CV LOWER VASCULAR RIGHT COMMON FEMORAL SPONT: NORMAL
BH CV LOWER VASCULAR RIGHT DISTAL FEMORAL COMPRESS: NORMAL
BH CV LOWER VASCULAR RIGHT GASTRONEMIUS COMPRESS: NORMAL
BH CV LOWER VASCULAR RIGHT GREATER SAPH AK COMPRESS: NORMAL
BH CV LOWER VASCULAR RIGHT GREATER SAPH BK COMPRESS: NORMAL
BH CV LOWER VASCULAR RIGHT LESSER SAPH COMPRESS: NORMAL
BH CV LOWER VASCULAR RIGHT MID FEMORAL AUGMENT: NORMAL
BH CV LOWER VASCULAR RIGHT MID FEMORAL COMPETENT: NORMAL
BH CV LOWER VASCULAR RIGHT MID FEMORAL COMPRESS: NORMAL
BH CV LOWER VASCULAR RIGHT MID FEMORAL PHASIC: NORMAL
BH CV LOWER VASCULAR RIGHT MID FEMORAL SPONT: NORMAL
BH CV LOWER VASCULAR RIGHT PERONEAL COMPRESS: NORMAL
BH CV LOWER VASCULAR RIGHT POPLITEAL AUGMENT: NORMAL
BH CV LOWER VASCULAR RIGHT POPLITEAL COMPETENT: NORMAL
BH CV LOWER VASCULAR RIGHT POPLITEAL COMPRESS: NORMAL
BH CV LOWER VASCULAR RIGHT POPLITEAL PHASIC: NORMAL
BH CV LOWER VASCULAR RIGHT POPLITEAL SPONT: NORMAL
BH CV LOWER VASCULAR RIGHT POSTERIOR TIBIAL COMPRESS: NORMAL
BH CV LOWER VASCULAR RIGHT PROXIMAL FEMORAL COMPRESS: NORMAL
BH CV LOWER VASCULAR RIGHT SAPHENOFEMORAL JUNCTION AUGMENT: NORMAL
BH CV LOWER VASCULAR RIGHT SAPHENOFEMORAL JUNCTION COMPETENT: NORMAL
BH CV LOWER VASCULAR RIGHT SAPHENOFEMORAL JUNCTION COMPRESS: NORMAL
BH CV LOWER VASCULAR RIGHT SAPHENOFEMORAL JUNCTION PHASIC: NORMAL
BH CV LOWER VASCULAR RIGHT SAPHENOFEMORAL JUNCTION SPONT: NORMAL
BILIRUB SERPL-MCNC: 0.3 MG/DL (ref 0.1–1.2)
BUN BLD-MCNC: 12 MG/DL (ref 6–20)
BUN/CREAT SERPL: 18.5 (ref 7–25)
CALCIUM SPEC-SCNC: 9.4 MG/DL (ref 8.6–10.5)
CHLORIDE SERPL-SCNC: 103 MMOL/L (ref 98–107)
CO2 SERPL-SCNC: 26.5 MMOL/L (ref 22–29)
CREAT BLD-MCNC: 0.65 MG/DL (ref 0.57–1)
DEPRECATED RDW RBC AUTO: 50.3 FL (ref 37–54)
EOSINOPHIL # BLD AUTO: 0.29 10*3/MM3 (ref 0–0.7)
EOSINOPHIL NFR BLD AUTO: 4.7 % (ref 0.3–6.2)
ERYTHROCYTE [DISTWIDTH] IN BLOOD BY AUTOMATED COUNT: 18.1 % (ref 11.7–13)
GFR SERPL CREATININE-BSD FRML MDRD: 95 ML/MIN/1.73
GLOBULIN UR ELPH-MCNC: 3 GM/DL
GLUCOSE BLD-MCNC: 81 MG/DL (ref 65–99)
HCT VFR BLD AUTO: 35 % (ref 35.6–45.5)
HGB BLD-MCNC: 10.2 G/DL (ref 11.9–15.5)
IMM GRANULOCYTES # BLD: 0 10*3/MM3 (ref 0–0.03)
IMM GRANULOCYTES NFR BLD: 0 % (ref 0–0.5)
LYMPHOCYTES # BLD AUTO: 2.29 10*3/MM3 (ref 0.9–4.8)
LYMPHOCYTES NFR BLD AUTO: 36.8 % (ref 19.6–45.3)
MCH RBC QN AUTO: 22.1 PG (ref 26.9–32)
MCHC RBC AUTO-ENTMCNC: 29.1 G/DL (ref 32.4–36.3)
MCV RBC AUTO: 75.8 FL (ref 80.5–98.2)
MONOCYTES # BLD AUTO: 0.55 10*3/MM3 (ref 0.2–1.2)
MONOCYTES NFR BLD AUTO: 8.8 % (ref 5–12)
NEUTROPHILS # BLD AUTO: 3.06 10*3/MM3 (ref 1.9–8.1)
NEUTROPHILS NFR BLD AUTO: 49.2 % (ref 42.7–76)
NT-PROBNP SERPL-MCNC: 207.4 PG/ML (ref 0–900)
PLATELET # BLD AUTO: 360 10*3/MM3 (ref 140–500)
PMV BLD AUTO: 9 FL (ref 6–12)
POTASSIUM BLD-SCNC: 4.2 MMOL/L (ref 3.5–5.2)
PROT SERPL-MCNC: 7 G/DL (ref 6–8.5)
RBC # BLD AUTO: 4.62 10*6/MM3 (ref 3.9–5.2)
SODIUM BLD-SCNC: 141 MMOL/L (ref 136–145)
WBC NRBC COR # BLD: 6.22 10*3/MM3 (ref 4.5–10.7)

## 2017-03-23 PROCEDURE — 93970 EXTREMITY STUDY: CPT

## 2017-03-23 PROCEDURE — 85025 COMPLETE CBC W/AUTO DIFF WBC: CPT | Performed by: NURSE PRACTITIONER

## 2017-03-23 PROCEDURE — 93000 ELECTROCARDIOGRAM COMPLETE: CPT | Performed by: NURSE PRACTITIONER

## 2017-03-23 PROCEDURE — 80053 COMPREHEN METABOLIC PANEL: CPT | Performed by: NURSE PRACTITIONER

## 2017-03-23 PROCEDURE — 36415 COLL VENOUS BLD VENIPUNCTURE: CPT | Performed by: NURSE PRACTITIONER

## 2017-03-23 PROCEDURE — 99214 OFFICE O/P EST MOD 30 MIN: CPT | Performed by: NURSE PRACTITIONER

## 2017-03-23 PROCEDURE — 83880 ASSAY OF NATRIURETIC PEPTIDE: CPT | Performed by: NURSE PRACTITIONER

## 2017-03-23 NOTE — PROGRESS NOTES
Date of Office Visit: 2017  Encounter Provider: CASSIDY Koroma  Place of Service: [facility] CARDIOLOGY  Patient Name: Susy Sierra  :1965    Chief Complaint   Patient presents with   • Edema     JK PT   :     HPI: Susy Sierra is a 52 y.o. female who presents today for evaluation of lower extremity edema.    Upon review of the patient's records, she saw Dr. Aguilar Campvoerde of our practice in February of this year to establish care.  In  she developed progressively worsening exertional dyspnea and chest pain.  An echocardiogram was performed which showed a normal ejection fraction of 60%, grade 1 diastolic dysfunction, mild right ventricular enlargement, and mild mitral regurgitation.  A stress test was reportedly negative.  She underwent cardiac catheterization in 2016 which showed normal coronary arteries and some catheter induced vasospasm noted in the ostial right coronary artery.  She was started on amlodipine and isosorbide.  Her symptoms improved somewhat but did not resolve completely.  She continued to experience fleeting chest pain that was nonexertional.  She also has exertional dyspnea. She was hospitalized in January with gram-negative bacteremia highly nephritis.  She was noted to be significantly anemic and had leukocytosis.    Dr. Campoverde noted on her last office visit that this coronary vasospasm is a questionable diagnosis has many people have catheter induced vasospasm during cardiac catheterization but did not have spontaneous coronary vasospasms at other times.  Her chest pain is described as noncardiac and her chronic exertional dyspnea could not be explained by either diagnosis.  She was experiencing a cough and Dr. Campoverde's that although most coughs or not secondary to losartan she would stop the medication and see how she felt.  Her losartan was discontinued and amlodipine increased to 5 mg daily.  She was changed from isosorbide  dinitrate mononitrate 30 mg daily.  She recommended a repeat 2-D echocardiogram and to have a recheck of her CBC. The 2-D echocardiogram dated 2/13/2017 showed the following ejection fraction 57%, no wall motion about his, mild mitral valve prolapse of the posterior leaflet present, cannot exclude a small tear, mild to moderate mitral regurgitation.  Her repeat CBC revealed a hemoglobin of 9.6 and hematocrit of 32.7, both of these lab values were in improvement.    The patient presents today with a chief concern of bilateral lower extremity edema.  She has also gained 14 pounds since her last visit.  She continues to experience some atypical chest pain and shortness of breath but feels that it's unchanged and has actually improved.  She stopped her losartan and her cough has completely resolved.  She did not increase the amlodipine to 5 mg because her blood pressure was well-controlled and this was previously discussed with Dr. Campoverde.  The patient states that she has been eating more sodium in her diet because her mother was cooking for her as she was living at her home.  I asked her what she's recently been eating since she moved out she's responded baked potatoes and soup.  She denies paroxysmal nocturnal dyspnea, orthopnea, palpitations, dizziness, or syncope.  Her blood pressure is well-controlled today in the office.  I asked her if there was anything else that was different since her last office visit.  She says she's been experiencing bursitis of her arm and is taking Aleve 2 tablets daily.    Past Medical History:   Diagnosis Date   • Acute pyelonephritis     1/2017   • Asthma    • Chest pain    • Constipation    • Coronary artery spasm     cath 3/2016 with catheter induced spasm of ostial RCA, ? if she has actual coronary vasospasm at other times   • Cough    • BEASLEY (dyspnea on exertion)    • Dyspnea    • Essential hypertension    • Iron deficiency anemia due to chronic blood loss    • Seasonal allergies    •  Sepsis due to Gram negative bacteria     due to pyelo       Past Surgical History:   Procedure Laterality Date   • CARPAL TUNNEL RELEASE     • CHOLECYSTECTOMY     • HYSTERECTOMY     • MANDIBLE SURGERY     • OVARIAN CYST REMOVAL     • SHOULDER SURGERY         Social History     Social History   • Marital status:      Spouse name: N/A   • Number of children: N/A   • Years of education: N/A     Occupational History   • Not on file.     Social History Main Topics   • Smoking status: Never Smoker   • Smokeless tobacco: Never Used   • Alcohol use 0.6 oz/week     1 Glasses of wine per week      Comment: once per wk    • Drug use: No   • Sexual activity: Defer     Other Topics Concern   • Not on file     Social History Narrative       Family History   Problem Relation Age of Onset   • Heart disease Mother    • Heart disease Father    • Heart disease Sister    • Heart disease Paternal Grandmother        Review of Systems   Constitution: Negative for chills, diaphoresis, fever, malaise/fatigue, night sweats, weight gain and weight loss.   HENT: Negative for hearing loss, nosebleeds, sore throat and tinnitus.    Eyes: Negative for blurred vision, double vision, pain and visual disturbance.   Cardiovascular: Positive for chest pain, dyspnea on exertion and leg swelling. Negative for claudication, cyanosis, irregular heartbeat, near-syncope, orthopnea, palpitations, paroxysmal nocturnal dyspnea and syncope.   Respiratory: Negative for cough, hemoptysis, shortness of breath, snoring and wheezing.    Endocrine: Negative for cold intolerance, heat intolerance and polyuria.   Hematologic/Lymphatic: Negative for bleeding problem. Does not bruise/bleed easily.   Skin: Negative for color change, dry skin, flushing and itching.   Musculoskeletal: Negative for falls, joint pain, joint swelling, muscle cramps, muscle weakness and myalgias.   Gastrointestinal: Negative for abdominal pain, constipation, heartburn, melena, nausea and  vomiting.   Genitourinary: Negative for dysuria and hematuria.   Neurological: Negative for excessive daytime sleepiness, dizziness, light-headedness, loss of balance, numbness, paresthesias, seizures and vertigo.   Psychiatric/Behavioral: Negative for altered mental status, depression, memory loss and substance abuse. The patient does not have insomnia and is not nervous/anxious.    Allergic/Immunologic: Negative for environmental allergies.       Allergies   Allergen Reactions   • Motrin [Ibuprofen]          Current Outpatient Prescriptions:   •  albuterol (PROVENTIL HFA;VENTOLIN HFA) 108 (90 BASE) MCG/ACT inhaler, Inhale 2 puffs Every 4 (Four) Hours As Needed for wheezing., Disp: , Rfl:   •  amLODIPine (NORVASC) 2.5 MG tablet, Take 1 tablet by mouth Daily., Disp: , Rfl:   •  aspirin 81 MG chewable tablet, Chew 81 mg Daily., Disp: , Rfl:   •  atorvastatin (LIPITOR) 40 MG tablet, Take 40 mg by mouth Daily., Disp: , Rfl:   •  benzonatate (TESSALON) 100 MG capsule, Take 100 mg by mouth 3 (Three) Times a Day As Needed for cough., Disp: , Rfl:   •  cetirizine (zyrTEC) 10 MG tablet, Take 10 mg by mouth Daily., Disp: , Rfl:   •  ferrous sulfate 325 (65 FE) MG tablet, Take 1 tablet by mouth 2 (Two) Times a Day With Meals., Disp: 60 tablet, Rfl: 2  •  fluticasone (FLONASE) 50 MCG/ACT nasal spray, 2 sprays into each nostril Daily., Disp: , Rfl:   •  furosemide (LASIX) 20 MG tablet, Take 20 mg by mouth Daily., Disp: , Rfl:   •  isosorbide mononitrate (IMDUR) 30 MG 24 hr tablet, Take 1 tablet by mouth Daily., Disp: 30 tablet, Rfl: 6  •  montelukast (SINGULAIR) 10 MG tablet, Take 10 mg by mouth Every Night., Disp: , Rfl:   •  PARoxetine (PAXIL) 20 MG tablet, Take 20 mg by mouth Every Morning., Disp: , Rfl:   •  polyethylene glycol (MIRALAX) packet, Take 17 g by mouth Daily., Disp: 30 each, Rfl: 0  •  sennosides-docusate sodium (SENOKOT-S) 8.6-50 MG tablet, Take 2 tablets by mouth Every Night., Disp: 60 tablet, Rfl: 0    "  Objective:     Vitals:    03/23/17 0923 03/23/17 0935   BP: 124/80 124/80   BP Location: Left arm Right arm   Pulse: 64    Weight: 191 lb 6.4 oz (86.8 kg)    Height: 69\" (175.3 cm)      Body mass index is 28.26 kg/(m^2).    PHYSICAL EXAM:    Vitals Reviewed.   General Appearance: No acute distress, well developed and well nourished.   Eyes: Conjunctiva and lids: No erythema, swelling, or discharge. Sclera non-icteric.   HENT: Atraumatic, normocephalic. External eyes, ears, and nose normal. No hearing loss noted. Mucous membranes normal. Lips not cyanotic. Neck supple with no tenderness.  Respiratory: No signs of respiratory distress. Respiration rhythm and depth normal.   Clear to auscultation. No rales, crackles, rhonchi, or wheezing auscultated.   Cardiovascular:  Jugular Venous Pressure: Normal  Heart Rate and Rhythm: Normal, Heart Sounds: Normal S1 and S2. No S3 or S4 noted.  Murmurs: No murmurs noted. No rubs, thrills, or gallops.   Arterial Pulses: Carotid pulses normal. No carotid bruit noted. Posterior tibialis and dorsalis pedis pulses normal.   Lower Extremities: +1 pitting edema noted bilaterally up to knees.   Gastrointestinal:  Abdomen soft, non-distended, non-tender. Normal bowel sounds. No hepatomegaly.   Musculoskeletal: Normal movement of extremities  Skin and Nails: General appearance normal. No pallor, cyanosis, diaphoresis. Skin temperature normal. No clubbing of fingernails.   Psychiatric: Patient alert and oriented to person, place, and time. Speech and behavior appropriate. Normal mood and affect.       ECG 12 Lead  Date/Time: 3/23/2017 9:33 AM  Performed by: DAMON GARCIA  Authorized by: DAMON GARCIA   Comparison: compared with previous ECG from 2/3/2017  Similar to previous ECG  Rhythm: sinus rhythm  Rate: normal  BPM: 64  Conduction: conduction normal  ST Segments: ST segments normal  T Waves: T waves normal  QRS axis: normal  Other: no other findings  Clinical impression: normal " ECG              Assessment:       Diagnosis Plan   1. Localized edema  proBNP    Comprehensive Metabolic Panel    CBC Auto Differential    Duplex Venous Lower Extremity - Bilateral CAR    ECG 12 Lead   2. Shortness of breath  proBNP    Comprehensive Metabolic Panel    CBC Auto Differential    Duplex Venous Lower Extremity - Bilateral CAR   3. Weight gain  proBNP    Comprehensive Metabolic Panel    CBC Auto Differential    Duplex Venous Lower Extremity - Bilateral CAR   4. Coronary artery spasm  ECG 12 Lead   5. Essential hypertension     6. Iron deficiency anemia due to chronic blood loss            Plan:       1.  Lower Extremity Edema and Weight Gain: The patient presents today with new onset bilateral +1 pitting lower extremity edema and a weight gain of 14 pounds over the past month.  The patient does experience atypical chest pain and shortness of breath, but feels that this is actually improved.  I discussed the patient's plan of care with Dr. Aguilar Campoverde recently saw the patient.  We have recommended the patient have a bilateral venous Doppler completed at the hospital to rule out deep vein thrombosis.  In addition, we have recommended blood work including a CBC, CMP, and proBNP level.  There is a possibility that she may be experiencing acute renal issues as she had polynephritis in January.  She has recently been taking Aleve on a daily basis for treatment of bursitis.  We do not feel the patient needs a repeat 2-D echocardiogram as this was recently done in February and did not show any evidence of systolic or diastolic heart failure.  She may also be experiencing bilateral lower extremity edema secondary to her amlodipine although the dosage of 2.5 mg daily has remained the same.  She does take furosemide 20 mg daily.  I've also encouraged the patient to follow a low-sodium diet.  She does feel that she may have been eating an increased amount of sodium while living with her mother who was doing the  cooking.    2.  Coronary Artery Spasm: This was a diagnosis the patient received in the past.  No evidence of recent coronary artery spasms or changes and chest pain according to the patient.    3.  Essential Hypertension: The patient's blood pressure is well-controlled today.    4.  Anemia: The patient had a repeat CBC and February which showed an improvement of her hemoglobin.  I will arrange for her to have a repeat CBC completed today.    As always, it has been a pleasure to participate in your patient's care.      Sincerely,         CASSIDY Pittman

## 2017-03-23 NOTE — TELEPHONE ENCOUNTER
The patient had a bilateral venous duplex completed which was normal.    I ordered a pro BNP and comprehensive metabolic panel, both normal.  Hemoglobin 10.2 low, hematocrit 35 low, this has improved.     Patient given results. I told her I will further discuss the leg swelling with Dr. Aguilar Campoverde. I have recommend a low sodium diet.

## 2017-03-23 NOTE — PROGRESS NOTES
Vascular lab bilateral lower extremity venous doppler complete, prelim negative DVT - Cat Benito, APRN aware

## 2017-03-24 NOTE — TELEPHONE ENCOUNTER
Sharona-please call the patient to schedule a CT scan of the pelvis with IV contrast, not oral contrast.  Please have this arranged for early next week.  Thank you

## 2017-03-24 NOTE — TELEPHONE ENCOUNTER
Decrease amlodipine to 2.5mg.    Stop all nsaids.     Keep legs elevated over weekend and start wearing Jobst Hose when able.     Will you please order outpatient CT of the pelvis?  With IV but not oral contrast.

## 2017-03-24 NOTE — TELEPHONE ENCOUNTER
I spoke with the patient about Dr. Campoverde's recommendations.    The patient is already taking amlodipine 2-1/2 mg daily, side asked her to discontinue the medication entirely.    I've also recommended a low sodium diet, discontinuation of NSAIDs, and elevation of legs.    I gave her the other recommendations and will arrange for her to have a CT scan of the pelvis with IV contrast.

## 2017-03-24 NOTE — TELEPHONE ENCOUNTER
I spoke with [facility] MD. the patient has been approved for a CT angiogram of the pelvis with contrast.      The case number is [id number]  The approval is [id number]    Sharona-please arrange for this new test to be completed.  I have canceled the previous order.

## 2017-03-28 ENCOUNTER — TELEPHONE (OUTPATIENT)
Dept: CARDIOLOGY | Facility: HOSPITAL | Age: 52
End: 2017-03-28

## 2017-03-28 DIAGNOSIS — R60.0 BILATERAL EDEMA OF LOWER EXTREMITY: Primary | ICD-10-CM

## 2017-03-28 NOTE — TELEPHONE ENCOUNTER
The patient states that her lower extremity edema has worsened.  She denies any chest pain, shortness of breath, or palpitations.  Dr. Campoverde's that it would be fine for her to have thyroid testing completed and I placed those orders.    I ordered a CTA of the pelvis and this is not scheduled until 4/7/17.  I'm going to see if I can get the scheduling to move this testing to this week.  The patient does not have a primary care physician to look for other etiologies.  I told her if the swelling worsens she may need to present to the emergency department.    She has stopped her amlodipine and feels that her blood pressure is well-controlled.

## 2017-03-28 NOTE — TELEPHONE ENCOUNTER
This patient is having acute swelling of the lower extremities.  We need to rule out some type of vascular issue in her abdomen.  Can you change this status to stat or have it completed in the next day or 2?     Thanks.

## 2017-03-30 ENCOUNTER — HOSPITAL ENCOUNTER (OUTPATIENT)
Dept: CT IMAGING | Facility: HOSPITAL | Age: 52
Discharge: HOME | End: 2017-03-30
Admitting: NURSE PRACTITIONER

## 2017-03-30 ENCOUNTER — LAB (OUTPATIENT)
Dept: LAB | Facility: HOSPITAL | Age: 52
End: 2017-03-30

## 2017-03-30 DIAGNOSIS — R60.0 BILATERAL EDEMA OF LOWER EXTREMITY: ICD-10-CM

## 2017-03-30 LAB
CREAT BLDA-MCNC: 0.7 MG/DL (ref 0.6–1.3)
T4 FREE SERPL-MCNC: 0.93 NG/DL (ref 0.93–1.7)
TSH SERPL DL<=0.05 MIU/L-ACNC: 1.17 MIU/ML (ref 0.27–4.2)

## 2017-03-30 PROCEDURE — 0 IOPAMIDOL 61 % SOLUTION: Performed by: NURSE PRACTITIONER

## 2017-03-30 PROCEDURE — 84443 ASSAY THYROID STIM HORMONE: CPT | Performed by: NURSE PRACTITIONER

## 2017-03-30 PROCEDURE — 82565 ASSAY OF CREATININE: CPT

## 2017-03-30 PROCEDURE — 84439 ASSAY OF FREE THYROXINE: CPT | Performed by: NURSE PRACTITIONER

## 2017-03-30 PROCEDURE — 72193 CT PELVIS W/DYE: CPT

## 2017-03-30 RX ADMIN — IOPAMIDOL 85 ML: 612 INJECTION, SOLUTION INTRAVENOUS at 16:09

## 2017-03-31 ENCOUNTER — TELEPHONE (OUTPATIENT)
Dept: CARDIOLOGY | Facility: CLINIC | Age: 52
End: 2017-03-31

## 2017-03-31 NOTE — TELEPHONE ENCOUNTER
I called the patient and discuss with her the results of her thyroid testing as well as her CT scan of the abdomen and pelvis.  Her thyroid labs were within normal limits and her CT of the pelvis was negative for vein thrombosis or compression of the pelvic veins or the distal inferior vena cava.  The abnormal enhancement pattern of the kidneys that was seen on a prior CT scan from January 2017 was no longer present.  The kidneys appeared normal.  I did discuss this with Dr. Campoverde.  From a cardiac standpoint, there is nothing more that we can offer her to explain the swelling in her legs.  I have suggested that she could increase her dose of Lasix to 40 mg a day, and I've asked her to go back to see her primary care physician.  She indicated that she understood.

## 2017-04-05 ENCOUNTER — ANESTHESIA (OUTPATIENT)
Dept: GASTROENTEROLOGY | Facility: HOSPITAL | Age: 52
End: 2017-04-05

## 2017-04-05 ENCOUNTER — HOSPITAL ENCOUNTER (OUTPATIENT)
Facility: HOSPITAL | Age: 52
Setting detail: HOSPITAL OUTPATIENT SURGERY
Discharge: HOME | End: 2017-04-05
Attending: INTERNAL MEDICINE | Admitting: INTERNAL MEDICINE

## 2017-04-05 ENCOUNTER — ANESTHESIA EVENT (OUTPATIENT)
Dept: GASTROENTEROLOGY | Facility: HOSPITAL | Age: 52
End: 2017-04-05

## 2017-04-05 ENCOUNTER — TELEPHONE (OUTPATIENT)
Dept: GASTROENTEROLOGY | Facility: CLINIC | Age: 52
End: 2017-04-05

## 2017-04-05 VITALS
SYSTOLIC BLOOD PRESSURE: 111 MMHG | BODY MASS INDEX: 27.74 KG/M2 | RESPIRATION RATE: 14 BRPM | HEIGHT: 69 IN | WEIGHT: 187.31 LBS | DIASTOLIC BLOOD PRESSURE: 60 MMHG | TEMPERATURE: 97.6 F | HEART RATE: 69 BPM | OXYGEN SATURATION: 99 %

## 2017-04-05 DIAGNOSIS — D50.8 OTHER IRON DEFICIENCY ANEMIA: ICD-10-CM

## 2017-04-05 PROCEDURE — 88305 TISSUE EXAM BY PATHOLOGIST: CPT | Performed by: INTERNAL MEDICINE

## 2017-04-05 PROCEDURE — 25010000002 PROPOFOL 10 MG/ML EMULSION: Performed by: ANESTHESIOLOGY

## 2017-04-05 PROCEDURE — 45378 DIAGNOSTIC COLONOSCOPY: CPT | Performed by: INTERNAL MEDICINE

## 2017-04-05 PROCEDURE — 43239 EGD BIOPSY SINGLE/MULTIPLE: CPT | Performed by: INTERNAL MEDICINE

## 2017-04-05 PROCEDURE — 88312 SPECIAL STAINS GROUP 1: CPT | Performed by: INTERNAL MEDICINE

## 2017-04-05 RX ORDER — PROPOFOL 10 MG/ML
VIAL (ML) INTRAVENOUS CONTINUOUS PRN
Status: DISCONTINUED | OUTPATIENT
Start: 2017-04-05 | End: 2017-04-05 | Stop reason: SURG

## 2017-04-05 RX ORDER — LIDOCAINE HYDROCHLORIDE 20 MG/ML
INJECTION, SOLUTION INFILTRATION; PERINEURAL AS NEEDED
Status: DISCONTINUED | OUTPATIENT
Start: 2017-04-05 | End: 2017-04-05 | Stop reason: SURG

## 2017-04-05 RX ORDER — ONDANSETRON 2 MG/ML
4 INJECTION INTRAMUSCULAR; INTRAVENOUS ONCE AS NEEDED
Status: DISCONTINUED | OUTPATIENT
Start: 2017-04-05 | End: 2017-04-05 | Stop reason: HOSPADM

## 2017-04-05 RX ORDER — PROPOFOL 10 MG/ML
VIAL (ML) INTRAVENOUS AS NEEDED
Status: DISCONTINUED | OUTPATIENT
Start: 2017-04-05 | End: 2017-04-05 | Stop reason: SURG

## 2017-04-05 RX ORDER — SODIUM CHLORIDE 0.9 % (FLUSH) 0.9 %
1-10 SYRINGE (ML) INJECTION AS NEEDED
Status: DISCONTINUED | OUTPATIENT
Start: 2017-04-05 | End: 2017-04-05 | Stop reason: HOSPADM

## 2017-04-05 RX ORDER — SODIUM CHLORIDE, SODIUM LACTATE, POTASSIUM CHLORIDE, CALCIUM CHLORIDE 600; 310; 30; 20 MG/100ML; MG/100ML; MG/100ML; MG/100ML
30 INJECTION, SOLUTION INTRAVENOUS CONTINUOUS
Status: DISCONTINUED | OUTPATIENT
Start: 2017-04-05 | End: 2017-04-05 | Stop reason: HOSPADM

## 2017-04-05 RX ADMIN — PROPOFOL 200 MG: 10 INJECTION, EMULSION INTRAVENOUS at 08:20

## 2017-04-05 RX ADMIN — PROPOFOL 300 MCG/KG/MIN: 10 INJECTION, EMULSION INTRAVENOUS at 08:49

## 2017-04-05 RX ADMIN — LIDOCAINE HYDROCHLORIDE 100 MG: 20 INJECTION, SOLUTION INFILTRATION; PERINEURAL at 08:20

## 2017-04-05 RX ADMIN — SODIUM CHLORIDE, POTASSIUM CHLORIDE, SODIUM LACTATE AND CALCIUM CHLORIDE 30 ML/HR: 600; 310; 30; 20 INJECTION, SOLUTION INTRAVENOUS at 07:44

## 2017-04-05 NOTE — ANESTHESIA PREPROCEDURE EVALUATION
Anesthesia Evaluation     Patient summary reviewed and Nursing notes reviewed      Airway   Mallampati: I  TM distance: >3 FB  Neck ROM: full  no difficulty expected  Dental - normal exam     Pulmonary - normal exam   Cardiovascular - normal exam    ECG reviewed    (+) hypertension well controlled, CAD,       Neuro/Psych  GI/Hepatic/Renal/Endo      Musculoskeletal     Abdominal  - normal exam    Bowel sounds: normal.   Substance History      OB/GYN          Other                                    Anesthesia Plan    ASA 3     MAC     Anesthetic plan and risks discussed with patient.

## 2017-04-05 NOTE — PLAN OF CARE
Problem: Patient Care Overview (Adult)  Goal: Plan of Care Review  Outcome: Ongoing (interventions implemented as appropriate)    04/05/17 0705   Coping/Psychosocial Response Interventions   Plan Of Care Reviewed With patient   Patient Care Overview   Progress no change       Goal: Adult Individualization and Mutuality  Outcome: Ongoing (interventions implemented as appropriate)    04/05/17 0705   Mutuality/Individual Preferences   What Anxieties, Fears or Concerns Do You Have About Your Health or Care? NONE       Goal: Discharge Needs Assessment    04/05/17 0705   Discharge Needs Assessment   Concerns To Be Addressed no discharge needs identified   Discharge Disposition home or self-care   Living Environment   Transportation Available car         Problem: GI Endoscopy (Adult)  Intervention: Monitor/Manage Procedure Recovery    04/05/17 0705   Respiratory Interventions   Airway/Ventilation Management airway patency maintained;calming measures promoted   Coping/Psychosocial Interventions   Environmental Support calm environment promoted;distractions minimized   Cardiac Interventions   Warming Thermoregulation Maintenance warm blankets applied       Intervention: Prevent Michell-procedural Injury    04/05/17 0705   Positioning   Head Of Bed (HOB) Position HOB at 20-30 degrees         Goal: Signs and Symptoms of Listed Potential Problems Will be Absent or Manageable (GI Endoscopy)  Outcome: Ongoing (interventions implemented as appropriate)    04/05/17 0705   GI Endoscopy   Problems Assessed (GI Endoscopy) all   Problems Present (GI Endoscopy) none

## 2017-04-05 NOTE — TELEPHONE ENCOUNTER
----- Message from Savannah Stearns MD sent at 4/5/2017  8:56 AM EDT -----  pls place in 10 year [facility] recall, thx

## 2017-04-05 NOTE — H&P
"[facility]  Pre Procedure History & Physical    Chief Complaint:   Iron def anemia  E coli bacteriemia    Subjective     HPI:   Susy Sierra is a 51 y.o. female with a past medical history noted below who presents for evaluation of need for colonoscopy. She was hospitalized January 24-27th, she had E coli bacteremia that was thought to be due to pyelonephritis. She was having high fevers but no other symptoms. She was actually soaking her sheets with night sweats. She was treated with antibiotics and recovered She was also found to have an anemia-- low iron and was started on FeSO4. She denies any overt GI bleeding. She denies a prior history of anemia.     She has never had a colonoscopy and was advised to following her hospitalization     She does have chronic constipation since the birth of her daughter 28 years ago. She has been on various OTC medications but finds that they usually stop being effective. She will have \"pellets\" for weeks at a time. She feels incomplete evacuation. She is currently on miralax and Senna S and this is working well for her.      No family history of GI malignancy. No smoking, occasional ETOH. Works as an     Past Medical History:   Past Medical History:   Diagnosis Date   • Acute pyelonephritis     1/2017   • Anesthesia     hx, difficulty waking   • Asthma    • Chest pain    • Constipation    • Coronary artery spasm     cath 3/2016 with catheter induced spasm of ostial RCA, ? if she has actual coronary vasospasm at other times   • Cough    • BEASLEY (dyspnea on exertion)    • Dyspnea    • Essential hypertension    • Iron deficiency anemia due to chronic blood loss    • Seasonal allergies    • Sepsis due to Gram negative bacteria     due to pyelo       Family History:  Family History   Problem Relation Age of Onset   • Heart disease Mother    • Heart disease Father    • Heart disease Sister    • Heart disease Paternal Grandmother        Social " "History:   reports that she has never smoked. She has never used smokeless tobacco. She reports that she drinks about 0.6 oz of alcohol per week  She reports that she does not use illicit drugs.    Medications:   Prescriptions Prior to Admission   Medication Sig Dispense Refill Last Dose   • albuterol (PROVENTIL HFA;VENTOLIN HFA) 108 (90 BASE) MCG/ACT inhaler Inhale 2 puffs Every 4 (Four) Hours As Needed for wheezing.   4/3/2017   • aspirin 81 MG chewable tablet Chew 81 mg Daily.   3/22/2017   • atorvastatin (LIPITOR) 40 MG tablet Take 40 mg by mouth Daily.   4/4/2017 at Unknown time   • cetirizine (zyrTEC) 10 MG tablet Take 10 mg by mouth Daily.   4/5/2017 at 0545   • fluticasone (FLONASE) 50 MCG/ACT nasal spray 2 sprays into each nostril Daily.   Past Week at Unknown time   • furosemide (LASIX) 20 MG tablet Take 20 mg by mouth Daily.   4/5/2017 at 0545   • isosorbide mononitrate (IMDUR) 30 MG 24 hr tablet Take 1 tablet by mouth Daily. 30 tablet 6 4/5/2017 at 0545   • montelukast (SINGULAIR) 10 MG tablet Take 10 mg by mouth Every Night.   4/4/2017 at Unknown time   • PARoxetine (PAXIL) 20 MG tablet Take 10 mg by mouth Every Morning.   4/5/2017 at 0545   • polyethylene glycol (MIRALAX) packet Take 17 g by mouth Daily. 30 each 0 Past Month at Unknown time       Allergies:  Motrin [ibuprofen]    ROS:    The following systems were reviewed and negative;  constitution, respiratory, cardiovascular and gastrointestinal     Objective     Height 69\" (175.3 cm).    Physical Exam   Constitutional: Pt is oriented to person, place, and time and well-developed, well-nourished, and in no distress.   HENT:   Mouth/Throat: Oropharynx is clear and moist.   Neck: Normal range of motion. Neck supple.   Cardiovascular: Normal rate, regular rhythm and normal heart sounds.    Pulmonary/Chest: Effort normal and breath sounds normal. No respiratory distress. No  wheezes.   Abdominal: Soft. Bowel sounds are normal.   Skin: Skin is warm and " dry.   Psychiatric: Mood, memory, affect and judgment normal.     Assessment/Plan     Diagnosis:  Iron def anemia  E coli bacteriemia    Anticipated Surgical Procedure:  EGD and colonoscopy    The risks, benefits, and alternatives of this procedure have been discussed with the patient or the responsible party- the patient understands and agrees to proceed.

## 2017-04-05 NOTE — ANESTHESIA POSTPROCEDURE EVALUATION
Patient: Susy Sierra    Procedure Summary     Date Anesthesia Start Anesthesia Stop Room / Location    04/05/17 0812 0855  [facility] /  [facility]       Procedure Diagnosis Surgeon Provider    COLONOSCOPY (N/A ); ESOPHAGOGASTRODUODENOSCOPY WITH BIOPSIES (COLD) (N/A Esophagus) Other iron deficiency anemia  (Other iron deficiency anemia [D50.8]) MD Nancy Saba MD          Anesthesia Type: MAC  Last vitals  /60 (04/05/17 0915)    Temp      Pulse 69 (04/05/17 0915)   Resp 14 (04/05/17 0915)    SpO2 99 % (04/05/17 0915)      Post Anesthesia Care and Evaluation    Patient location during evaluation: PHASE II  Patient participation: complete - patient participated  Level of consciousness: awake  Pain score: 0  Pain management: adequate  Airway patency: patent  Anesthetic complications: No anesthetic complications    Cardiovascular status: acceptable  Respiratory status: acceptable  Hydration status: acceptable

## 2017-04-06 LAB
CYTO UR: NORMAL
LAB AP CASE REPORT: NORMAL
Lab: NORMAL
PATH REPORT.FINAL DX SPEC: NORMAL
PATH REPORT.GROSS SPEC: NORMAL

## 2017-04-06 NOTE — PROGRESS NOTES
The biopsies of her small bowel were normal, there was no evidence of celiac disease.  She had some mild gastritis on the biopsies of her stomach.  There is no evidence of H. pylori.  The biopsies of her lower esophagus were normal.  There was no evidence of Randhawa's esophagus.    She has already been placed in 10 year colonoscopy recall.    She can follow up with me in 4-5 months regarding her constipation.

## 2017-04-13 ENCOUNTER — TELEPHONE (OUTPATIENT)
Dept: GASTROENTEROLOGY | Facility: CLINIC | Age: 52
End: 2017-04-13

## 2017-04-13 NOTE — TELEPHONE ENCOUNTER
----- Message from Savannah Stearns MD sent at 4/6/2017  1:21 PM EDT -----  The biopsies of her small bowel were normal, there was no evidence of celiac disease.  She had some mild gastritis on the biopsies of her stomach.  There is no evidence of H. pylori.  The biopsies of her lower esophagus were normal.  There was no evidence of Randhawa's esophagus.    She has already been placed in 10 year colonoscopy recall.    She can follow up with me in 4-5 months regarding her constipation.

## 2017-04-13 NOTE — TELEPHONE ENCOUNTER
Pt called and advised per Dr Stearns that the bx of her sm bowel were normal.  There was no evidence of celiac disease.  She had some mild gastritis in her stomach.  There was no h pylori.  The bx of her lowe esophagus were normal and was neg for rhodes's esophagus.      She recommends to repeat c/s in 10 yrs and f/u with her in 4-5 mo regarding her constipation.  Pt verb understanding and made appt for 08/17/2017 at 830 am.

## 2017-04-25 ENCOUNTER — OFFICE VISIT (OUTPATIENT)
Dept: FAMILY MEDICINE CLINIC | Facility: CLINIC | Age: 52
End: 2017-04-25

## 2017-04-25 VITALS
TEMPERATURE: 98.1 F | HEIGHT: 69 IN | SYSTOLIC BLOOD PRESSURE: 120 MMHG | HEART RATE: 72 BPM | WEIGHT: 191 LBS | DIASTOLIC BLOOD PRESSURE: 78 MMHG | OXYGEN SATURATION: 97 % | BODY MASS INDEX: 28.29 KG/M2

## 2017-04-25 DIAGNOSIS — D64.9 ANEMIA, UNSPECIFIED TYPE: ICD-10-CM

## 2017-04-25 DIAGNOSIS — F32.A ANXIETY AND DEPRESSION: ICD-10-CM

## 2017-04-25 DIAGNOSIS — F41.9 ANXIETY AND DEPRESSION: ICD-10-CM

## 2017-04-25 DIAGNOSIS — E78.5 HYPERLIPIDEMIA, UNSPECIFIED HYPERLIPIDEMIA TYPE: Primary | ICD-10-CM

## 2017-04-25 DIAGNOSIS — J45.20 MILD INTERMITTENT ASTHMA WITHOUT COMPLICATION: ICD-10-CM

## 2017-04-25 DIAGNOSIS — R22.43 LOCALIZED SWELLING OF BOTH LOWER LEGS: ICD-10-CM

## 2017-04-25 DIAGNOSIS — J30.2 SEASONAL ALLERGIC RHINITIS, UNSPECIFIED ALLERGIC RHINITIS TRIGGER: ICD-10-CM

## 2017-04-25 PROBLEM — R05.9 COUGH: Status: RESOLVED | Noted: 2017-01-25 | Resolved: 2017-04-25

## 2017-04-25 PROCEDURE — 99203 OFFICE O/P NEW LOW 30 MIN: CPT | Performed by: FAMILY MEDICINE

## 2017-04-25 RX ORDER — FLUTICASONE PROPIONATE 50 MCG
2 SPRAY, SUSPENSION (ML) NASAL DAILY
Qty: 1 EACH | Refills: 5 | Status: SHIPPED | OUTPATIENT
Start: 2017-04-25 | End: 2017-04-25 | Stop reason: SDUPTHER

## 2017-04-25 RX ORDER — ATORVASTATIN CALCIUM 40 MG/1
40 TABLET, FILM COATED ORAL DAILY
Qty: 90 TABLET | Refills: 1 | Status: SHIPPED | OUTPATIENT
Start: 2017-04-25 | End: 2017-09-13 | Stop reason: SDUPTHER

## 2017-04-25 RX ORDER — MONTELUKAST SODIUM 10 MG/1
10 TABLET ORAL NIGHTLY
Qty: 90 TABLET | Refills: 1 | Status: SHIPPED | OUTPATIENT
Start: 2017-04-25 | End: 2018-01-18 | Stop reason: SDUPTHER

## 2017-04-25 RX ORDER — FLUTICASONE PROPIONATE 50 MCG
SPRAY, SUSPENSION (ML) NASAL
Qty: 48 ML | Refills: 5 | Status: SHIPPED | OUTPATIENT
Start: 2017-04-25

## 2017-04-25 RX ORDER — PAROXETINE 10 MG/1
10 TABLET, FILM COATED ORAL EVERY MORNING
Qty: 90 TABLET | Refills: 1 | Status: SHIPPED | OUTPATIENT
Start: 2017-04-25

## 2017-04-25 RX ORDER — FUROSEMIDE 20 MG/1
20 TABLET ORAL DAILY
Qty: 90 TABLET | Refills: 1 | Status: SHIPPED | OUTPATIENT
Start: 2017-04-25 | End: 2017-11-13 | Stop reason: SDUPTHER

## 2017-04-25 NOTE — PROGRESS NOTES
Subjective   Susy Sierra is a 52 y.o. female.   Chief Complaint   Patient presents with   • Hyperlipidemia   • Depression   • Edema   • Allergic Rhinitis   • Anemia       History of Present Illness     New patient in our office.  She moved here from [place].  She sees Dr. Campoverde for coronary artery spasms.  She had done in March 2016 which was negative.      #1 hyperlipidemia-patient is on atorvastatin 40 mg a day.  She takes it everyday.  She has no side effects.      #2 depression with anxiety-diagnosed years ago.  Patient was treated with multiple medications but Paxil works best.  She has no side effects from it.  She was weaned off successfully 3 or 4 times for year or 2, but then she had to restart it.  She is currently going through divorce.  She is on Paxil 10 mg a day.  She is in counseling monthly.  She is doing well.  PH 29 cassettes 2, SAW 7 at 3.  No suicidal ideations, no aggressive behaviors.  Next    #3 anemia- colonoscopy in 2017.  Was normal.  She was treated with iron for 1 month, but she stopped it.  Hemoglobin was 10.2 which was better.  She has no blood in stool, no black stools.  She is post hysterectomy.    #4 extremity edema-at least had cardiac workup which was negative.  She had a CT of abdomen and pelvis which was negative.  She is on Lasix 20 mg a day.  She takes everyday.  It helps with the symptoms.  She is on no potassium.  Potassium is normal at 3.9.  Kidney tests are normal.    #5 allergic rhinitis-on Flonase and singular.  Patient has no side effects from Flonase.  No nosebleeds.      #6  asthma-mild.  Diagnosed years ago.  Patient takes albuterol as needed.  She took it a few times within the last few months.    For more history please see health history form which was scanned.    The following portions of the patient's history were reviewed and updated as appropriate: allergies, current medications, past family history, past medical history, past social history, past surgical  history and problem list.    Review of Systems   Constitutional: Negative.    Respiratory: Negative.    Cardiovascular: Positive for leg swelling. Negative for chest pain and palpitations.   Psychiatric/Behavioral: Negative for suicidal ideas. The patient is not nervous/anxious.          Objective   Wt Readings from Last 3 Encounters:   04/25/17 191 lb (86.6 kg)   04/05/17 187 lb 5 oz (85 kg)   03/23/17 191 lb 6.4 oz (86.8 kg)      Vitals:    04/25/17 0836   BP: 120/78   Pulse: 72   Temp: 98.1 °F (36.7 °C)   SpO2: 97%     Temp Readings from Last 3 Encounters:   04/25/17 98.1 °F (36.7 °C)   04/05/17 97.6 °F (36.4 °C) (Oral)   01/27/17 97.9 °F (36.6 °C) (Oral)     BP Readings from Last 3 Encounters:   04/25/17 120/78   04/05/17 111/60   03/23/17 124/80     Pulse Readings from Last 3 Encounters:   04/25/17 72   04/05/17 69   03/23/17 64       Physical Exam   Constitutional: She is oriented to person, place, and time. She appears well-developed and well-nourished.   HENT:   Head: Normocephalic and atraumatic.   Neck: Neck supple. Carotid bruit is not present. No thyromegaly present.   Cardiovascular: Normal rate, regular rhythm and normal heart sounds.    Pulmonary/Chest: Effort normal and breath sounds normal.   Neurological: She is alert and oriented to person, place, and time.   Skin: Skin is warm, dry and intact.   Psychiatric: She has a normal mood and affect. Her behavior is normal.       Assessment/Plan   Susy was seen today for hyperlipidemia, depression, edema, allergic rhinitis and anemia.    Diagnoses and all orders for this visit:    Hyperlipidemia, unspecified hyperlipidemia type  -     CBC (No Diff); Future  -     Comprehensive Metabolic Panel; Future  -     Lipid Panel With LDL / HDL Ratio; Future  -     Thyroid Cascade Profile; Future  -     Vitamin D 25 Hydroxy; Future    Anxiety and depression  -     CBC (No Diff); Future  -     Comprehensive Metabolic Panel; Future  -     Lipid Panel With LDL / HDL  Ratio; Future  -     Thyroid Cascade Profile; Future  -     Vitamin D 25 Hydroxy; Future    Mild intermittent asthma without complication  -     CBC (No Diff); Future  -     Comprehensive Metabolic Panel; Future  -     Lipid Panel With LDL / HDL Ratio; Future  -     Thyroid Cascade Profile; Future  -     Vitamin D 25 Hydroxy; Future    Seasonal allergic rhinitis, unspecified allergic rhinitis trigger  -     CBC (No Diff); Future  -     Comprehensive Metabolic Panel; Future  -     Lipid Panel With LDL / HDL Ratio; Future  -     Thyroid Cascade Profile; Future  -     Vitamin D 25 Hydroxy; Future    Localized swelling of both lower legs  -     CBC (No Diff); Future  -     Comprehensive Metabolic Panel; Future  -     Lipid Panel With LDL / HDL Ratio; Future  -     Thyroid Cascade Profile; Future  -     Vitamin D 25 Hydroxy; Future    Anemia, unspecified type  -     CBC (No Diff); Future  -     CBC (No Diff); Future  -     Comprehensive Metabolic Panel; Future  -     Lipid Panel With LDL / HDL Ratio; Future  -     Thyroid Cascade Profile; Future  -     Vitamin D 25 Hydroxy; Future    Other orders  -     PARoxetine (PAXIL) 10 MG tablet; Take 1 tablet by mouth Every Morning.  -     montelukast (SINGULAIR) 10 MG tablet; Take 1 tablet by mouth Every Night.  -     fluticasone (FLONASE) 50 MCG/ACT nasal spray; 2 sprays into each nostril Daily.  -     atorvastatin (LIPITOR) 40 MG tablet; Take 1 tablet by mouth Daily.  -     furosemide (LASIX) 20 MG tablet; Take 1 tablet by mouth Daily.        #1 HLP-continue current treatment.  Check labs before next office visit.  Follow-up in 6 months.    #2 depression with anxiety-controlled.  Continue same.  Follow-up in 6 months, or sooner if problems.      #3 LE edema-continue current treatment.  We'll continue to monitor kidney tests and potassium.    #4 anemia-better, but uncontrolled.  Patient will restart iron and will recheck CBC in 2 months.    #5 allergic rhinitis-continue current  treatment.  Follow-up in 12 months.      #6 asthma-continue current treatment. Will need spirometry at next office visit.    Patient is advised to schedule physical exam with me within next few months.

## 2017-06-25 ENCOUNTER — RESULTS ENCOUNTER (OUTPATIENT)
Dept: FAMILY MEDICINE CLINIC | Facility: CLINIC | Age: 52
End: 2017-06-25

## 2017-06-25 DIAGNOSIS — D64.9 ANEMIA, UNSPECIFIED TYPE: ICD-10-CM

## 2017-07-11 ENCOUNTER — OFFICE VISIT (OUTPATIENT)
Dept: FAMILY MEDICINE CLINIC | Facility: CLINIC | Age: 52
End: 2017-07-11

## 2017-07-11 VITALS
SYSTOLIC BLOOD PRESSURE: 124 MMHG | TEMPERATURE: 98.4 F | HEIGHT: 69 IN | BODY MASS INDEX: 28.29 KG/M2 | OXYGEN SATURATION: 98 % | HEART RATE: 84 BPM | WEIGHT: 191 LBS | DIASTOLIC BLOOD PRESSURE: 80 MMHG

## 2017-07-11 DIAGNOSIS — L73.9 FOLLICULITIS: Primary | ICD-10-CM

## 2017-07-11 PROCEDURE — 99212 OFFICE O/P EST SF 10 MIN: CPT | Performed by: FAMILY MEDICINE

## 2017-07-11 NOTE — PROGRESS NOTES
"Subjective   Susy Sierra is a 52 y.o. female.   Chief Complaint   Patient presents with   • herpes     possible       History of Present Illness     #1 Rash in vaginal area, lower abdomen and upper tighs-started about 3 weeks ago.  It looks like \"small pimples\" different size.  They are not painful.  They are mildly itching.  Patient used topical cream for cold sores, not sure if it helped.  They got significantly better with time and currently patient only has a few in the pubic area.  She does shave.    The following portions of the patient's history were reviewed and updated as appropriate: allergies, current medications, past family history, past medical history, past social history and problem list.    Review of Systems   Constitutional: Negative.    Genitourinary: Negative for dysuria.   Skin: Positive for rash. Negative for wound.   Hematological: Negative.          Objective   Wt Readings from Last 3 Encounters:   07/11/17 191 lb (86.6 kg)   04/25/17 191 lb (86.6 kg)   04/05/17 187 lb 5 oz (85 kg)      Vitals:    07/11/17 1018   BP: 124/80   Pulse: 84   Temp: 98.4 °F (36.9 °C)   SpO2: 98%     Temp Readings from Last 3 Encounters:   07/11/17 98.4 °F (36.9 °C)   04/25/17 98.1 °F (36.7 °C)   04/05/17 97.6 °F (36.4 °C) (Oral)     BP Readings from Last 3 Encounters:   07/11/17 124/80   04/25/17 120/78   04/05/17 111/60     Pulse Readings from Last 3 Encounters:   07/11/17 84   04/25/17 72   04/05/17 69       Physical Exam   Constitutional: She is oriented to person, place, and time. She appears well-developed and well-nourished.   Neurological: She is alert and oriented to person, place, and time.   Skin: Skin is warm and dry.   White tiny follicles around pubic hair. No redness, no  blisters, no erosions.    Psychiatric: She has a normal mood and affect. Her behavior is normal.       Assessment/Plan   Susy was seen today for herpes.    Diagnoses and all orders for this visit:    Folliculitis        #1 " folliculitis-reassurance.  Discussed prevention of folliculitis.  Avoid shaving.

## 2017-08-17 ENCOUNTER — OFFICE VISIT (OUTPATIENT)
Dept: GASTROENTEROLOGY | Facility: CLINIC | Age: 52
End: 2017-08-17

## 2017-08-17 VITALS
BODY MASS INDEX: 29.3 KG/M2 | WEIGHT: 197.8 LBS | HEIGHT: 69 IN | SYSTOLIC BLOOD PRESSURE: 122 MMHG | DIASTOLIC BLOOD PRESSURE: 78 MMHG | TEMPERATURE: 97.7 F

## 2017-08-17 DIAGNOSIS — D50.0 IRON DEFICIENCY ANEMIA DUE TO CHRONIC BLOOD LOSS: Primary | ICD-10-CM

## 2017-08-17 DIAGNOSIS — K59.04 CHRONIC IDIOPATHIC CONSTIPATION: ICD-10-CM

## 2017-08-17 LAB
BASOPHILS # BLD AUTO: 0.06 10*3/MM3 (ref 0–0.2)
BASOPHILS NFR BLD AUTO: 1.3 % (ref 0–1.5)
DIFFERENTIAL COMMENT: NORMAL
EOSINOPHIL # BLD AUTO: 0.08 10*3/MM3 (ref 0–0.7)
EOSINOPHIL NFR BLD AUTO: 1.8 % (ref 0.3–6.2)
ERYTHROCYTE [DISTWIDTH] IN BLOOD BY AUTOMATED COUNT: 20.6 % (ref 11.7–13)
FERRITIN SERPL-MCNC: 40.73 NG/ML (ref 13–150)
HCT VFR BLD AUTO: 36.2 % (ref 35.6–45.5)
HGB BLD-MCNC: 10.1 G/DL (ref 11.9–15.5)
IMM GRANULOCYTES # BLD: 0.02 10*3/MM3 (ref 0–0.03)
IMM GRANULOCYTES NFR BLD: 0.4 % (ref 0–0.5)
IRON SATN MFR SERPL: 7 % (ref 20–50)
IRON SERPL-MCNC: 25 MCG/DL (ref 37–145)
LYMPHOCYTES # BLD AUTO: 1.46 10*3/MM3 (ref 0.9–4.8)
LYMPHOCYTES NFR BLD AUTO: 31.9 % (ref 19.6–45.3)
MCH RBC QN AUTO: 21.8 PG (ref 26.9–32)
MCHC RBC AUTO-ENTMCNC: 27.9 G/DL (ref 32.4–36.3)
MCV RBC AUTO: 78.2 FL (ref 80.5–98.2)
MONOCYTES # BLD AUTO: 0.65 10*3/MM3 (ref 0.2–1.2)
MONOCYTES NFR BLD AUTO: 14.2 % (ref 5–12)
NEUTROPHILS # BLD AUTO: 2.3 10*3/MM3 (ref 1.9–8.1)
NEUTROPHILS NFR BLD AUTO: 50.4 % (ref 42.7–76)
NRBC BLD AUTO-RTO: 0 /100 WBC (ref 0–0)
PLATELET # BLD AUTO: 274 10*3/MM3 (ref 140–500)
PLATELET BLD QL SMEAR: NORMAL
RBC # BLD AUTO: 4.63 10*6/MM3 (ref 3.9–5.2)
RBC MORPH BLD: NORMAL
TIBC SERPL-MCNC: 350 MCG/DL
UIBC SERPL-MCNC: 325 MCG/DL
WBC # BLD AUTO: 4.57 10*3/MM3 (ref 4.5–10.7)

## 2017-08-17 PROCEDURE — 99213 OFFICE O/P EST LOW 20 MIN: CPT | Performed by: INTERNAL MEDICINE

## 2017-08-17 NOTE — PATIENT INSTRUCTIONS
Daily activia or similar for good bacteria    Labs today    Trial of low dose linzess for constipation.  Let me know if this is helpful, then I will prescribe it.  Also can go to a higher dose if needed    For any additional questions, concerns or changes to your condition after today's office visit please contact the office at [phone].

## 2017-08-17 NOTE — PROGRESS NOTES
Chief Complaint   Patient presents with   • Follow-up     from scopes      Subjective     [facility]  Susy Sierra is a 52 y.o. female who presents for follow up of iron deficiency and constipation.  She was also hospitalized in January 2017 with Escherichia coli bacteremia thought to be due to pyelonephritis.    In terms of constipation, she will go about 10 days to 2 weeks with constipation where she has pebbly and hard to pass stools. She tried miralax as per my recommendation at her last visit but found this caused soft and difficult to pass stools causing her to have to wipe a lot to try to clean herself after a BM, subsequently she stopped.  For about 1 day every 2 weeks she will have a normal and large stool.  Then she will go right back to constipation.  She denies any rectal bleeding.  Symptoms associated with bloating.    In terms of her CARLENE and history of E coli bacteremia, 4/5/17 EGD and colonoscopy with no evidence of celiac disease, mild gastritis on the biopsies of her stomach, no evidence of H. Pylori, no evidence of Randhawa's esophagus.  Entirely normal colonoscopy.  She is s/p partial hysterectomy.    No nausea, vomiting, weight loss.  Complains of weight gain.  Otherwise doing well.    Past Medical History:   Diagnosis Date   • Acute pyelonephritis     1/2017   • Anemia     significant   • Anesthesia     hx, difficulty waking   • Asthma    • Chest pain    • CHF (congestive heart failure)    • Constipation    • Coronary artery spasm     cath 3/2016 with catheter induced spasm of ostial RCA, ? if she has actual coronary vasospasm at other times   • Cough     chronic   • Diastolic dysfunction     grade 1   • BEASLEY (dyspnea on exertion)    • Dyspnea    • Enlarged RV (right ventricle)     mild   • Essential hypertension    • Iron deficiency anemia due to chronic blood loss    • Leukocytosis    • Malaise and fatigue    • Mild mitral regurgitation    • Seasonal allergies    • Sepsis due to Gram negative bacteria      due to pyelo       Social History     Social History   • Marital status:      Spouse name: N/A   • Number of children: N/A   • Years of education: N/A     Social History Main Topics   • Smoking status: Never Smoker   • Smokeless tobacco: Never Used   • Alcohol use 0.6 oz/week     1 Glasses of wine per week      Comment: once per wk    • Drug use: No   • Sexual activity: Defer     Other Topics Concern   • None     Social History Narrative         Current Outpatient Prescriptions:   •  albuterol (PROVENTIL HFA;VENTOLIN HFA) 108 (90 BASE) MCG/ACT inhaler, Inhale 2 puffs Every 4 (Four) Hours As Needed for wheezing., Disp: , Rfl:   •  atorvastatin (LIPITOR) 40 MG tablet, Take 1 tablet by mouth Daily., Disp: 90 tablet, Rfl: 1  •  cetirizine (zyrTEC) 10 MG tablet, Take 10 mg by mouth Daily., Disp: , Rfl:   •  fluticasone (FLONASE) 50 MCG/ACT nasal spray, SHAKE LIQUID AND USE 2 SPRAYS IN EACH NOSTRIL DAILY, Disp: 48 mL, Rfl: 5  •  furosemide (LASIX) 20 MG tablet, Take 1 tablet by mouth Daily., Disp: 90 tablet, Rfl: 1  •  isosorbide mononitrate (IMDUR) 30 MG 24 hr tablet, Take 1 tablet by mouth Daily., Disp: 30 tablet, Rfl: 6  •  montelukast (SINGULAIR) 10 MG tablet, Take 1 tablet by mouth Every Night., Disp: 90 tablet, Rfl: 1  •  PARoxetine (PAXIL) 10 MG tablet, Take 1 tablet by mouth Every Morning., Disp: 90 tablet, Rfl: 1  •  aspirin 81 MG chewable tablet, Chew 81 mg Daily., Disp: , Rfl:   •  polyethylene glycol (MIRALAX) packet, Take 17 g by mouth Daily., Disp: 30 each, Rfl: 0    Review of Systems   Constitutional: Negative for activity change, appetite change and fatigue.   HENT: Negative for sore throat and trouble swallowing.    Respiratory: Negative.    Cardiovascular: Negative.    Gastrointestinal: Positive for constipation. Negative for abdominal distention, abdominal pain and blood in stool.   Endocrine: Negative for cold intolerance and heat intolerance.   Genitourinary: Negative for difficulty  urinating, dysuria and frequency.   Musculoskeletal: Negative for arthralgias, back pain and myalgias.   Skin: Negative.    Hematological: Negative for adenopathy. Does not bruise/bleed easily.   All other systems reviewed and are negative.      Objective   Vitals:    08/17/17 0843   BP: 122/78   Temp: 97.7 °F (36.5 °C)     Last Weight    08/17/17  0843   Weight: 197 lb 12.8 oz (89.7 kg)     Body mass index is 29.21 kg/(m^2).      Physical Exam   Constitutional: She is oriented to person, place, and time. She appears well-developed and well-nourished. No distress.   HENT:   Head: Normocephalic and atraumatic.   Right Ear: External ear normal.   Left Ear: External ear normal.   Nose: Nose normal.   Eyes: Conjunctivae and EOM are normal. No scleral icterus.   Cardiovascular: Normal rate, regular rhythm, normal heart sounds and intact distal pulses.  Exam reveals no gallop.    No murmur heard.  No lower extremity edema   Pulmonary/Chest: Effort normal and breath sounds normal. No respiratory distress. She has no wheezes.   Abdominal: Soft. Normal appearance and bowel sounds are normal. She exhibits no distension and no mass. There is no hepatosplenomegaly. There is no tenderness. There is no rigidity, no rebound and no guarding.   Genitourinary:   Genitourinary Comments: Rectal exam deferred   Musculoskeletal: Normal range of motion. She exhibits no edema or tenderness.   Normal digits and nails of both hands.  No atrophy or wasting of upper or lower extremeties   Neurological: She is alert and oriented to person, place, and time. She displays no atrophy. Coordination normal.   Skin: Skin is warm and dry. No rash noted. She is not diaphoretic. No erythema.   Psychiatric: She has a normal mood and affect. Her behavior is normal. Judgment and thought content normal.   Vitals reviewed.      WBC   Date Value Ref Range Status   03/23/2017 6.22 4.50 - 10.70 10*3/mm3 Final     RBC   Date Value Ref Range Status   03/23/2017  4.62 3.90 - 5.20 10*6/mm3 Final     Hemoglobin   Date Value Ref Range Status   03/23/2017 10.2 (L) 11.9 - 15.5 g/dL Final     Hematocrit   Date Value Ref Range Status   03/23/2017 35.0 (L) 35.6 - 45.5 % Final     MCV   Date Value Ref Range Status   03/23/2017 75.8 (L) 80.5 - 98.2 fL Final     MCH   Date Value Ref Range Status   03/23/2017 22.1 (L) 26.9 - 32.0 pg Final     MCHC   Date Value Ref Range Status   03/23/2017 29.1 (L) 32.4 - 36.3 g/dL Final     RDW   Date Value Ref Range Status   03/23/2017 18.1 (H) 11.7 - 13.0 % Final     RDW-SD   Date Value Ref Range Status   03/23/2017 50.3 37.0 - 54.0 fl Final     MPV   Date Value Ref Range Status   03/23/2017 9.0 6.0 - 12.0 fL Final     Platelets   Date Value Ref Range Status   03/23/2017 360 140 - 500 10*3/mm3 Final     Neutrophil %   Date Value Ref Range Status   03/23/2017 49.2 42.7 - 76.0 % Final     Lymphocyte %   Date Value Ref Range Status   03/23/2017 36.8 19.6 - 45.3 % Final     Monocyte %   Date Value Ref Range Status   03/23/2017 8.8 5.0 - 12.0 % Final     Eosinophil %   Date Value Ref Range Status   03/23/2017 4.7 0.3 - 6.2 % Final     Basophil %   Date Value Ref Range Status   03/23/2017 0.5 0.0 - 1.5 % Final     Immature Grans %   Date Value Ref Range Status   03/23/2017 0.0 0.0 - 0.5 % Final     Neutrophils, Absolute   Date Value Ref Range Status   03/23/2017 3.06 1.90 - 8.10 10*3/mm3 Final     Lymphocytes, Absolute   Date Value Ref Range Status   03/23/2017 2.29 0.90 - 4.80 10*3/mm3 Final     Monocytes, Absolute   Date Value Ref Range Status   03/23/2017 0.55 0.20 - 1.20 10*3/mm3 Final     Eosinophils, Absolute   Date Value Ref Range Status   03/23/2017 0.29 0.00 - 0.70 10*3/mm3 Final     Basophils, Absolute   Date Value Ref Range Status   03/23/2017 0.03 0.00 - 0.20 10*3/mm3 Final     Immature Grans, Absolute   Date Value Ref Range Status   03/23/2017 0.00 0.00 - 0.03 10*3/mm3 Final     nRBC   Date Value Ref Range Status   01/25/2017 0.0 0.0 - 0.0  /100 WBC Final       Lab Results   Component Value Date    GLUCOSE 81 03/23/2017    BUN 12 03/23/2017    CREATININE 0.70 03/30/2017    EGFRIFNONA 95 03/23/2017    BCR 18.5 03/23/2017    CO2 26.5 03/23/2017    CALCIUM 9.4 03/23/2017    ALBUMIN 4.00 03/23/2017    LABIL2 1.3 03/23/2017    AST 19 03/23/2017    ALT 24 03/23/2017         Imaging Results (last 7 days)     ** No results found for the last 168 hours. **            Assessment/Plan    1. Constipation: Persistent.  Associated with bloating.  She found more problems than relief with daily MiraLAX  2. Iron deficiency anemia: No source of bleeding identified on her scope test.  Most likely this was the in the setting of acute infection back in January.  Last labs in March with a hemoglobin of around 10.  None since then    Plan:  -Trial of 72 µg dose of Linzess.  I have given her 8 doses to try.  If she finds this efficacious then we'll prescribe it for her.  She is also let me know if she needs a higher dose.  -Daily activity yogurt for regularity and a probiotic effect  -Labs today with CBC and iron panel    Susy was seen today for follow-up.    Diagnoses and all orders for this visit:    Iron deficiency anemia due to chronic blood loss  -     CBC & Differential  -     Iron Profile  -     Ferritin    Chronic idiopathic constipation        Dictated utilizing Dragon dictation

## 2017-08-18 NOTE — PROGRESS NOTES
She remains anemic with low iron stores.    I have ordered iron for her to start     Can we please schedule a small bowel capsule for her, thanks

## 2017-08-21 ENCOUNTER — TELEPHONE (OUTPATIENT)
Dept: GASTROENTEROLOGY | Facility: CLINIC | Age: 52
End: 2017-08-21

## 2017-08-21 NOTE — TELEPHONE ENCOUNTER
----- Message from Savannah Stearns MD sent at 8/18/2017  6:03 PM EDT -----  She remains anemic with low iron stores.    I have ordered iron for her to start     Can we please schedule a small bowel capsule for her, thanks

## 2017-08-21 NOTE — TELEPHONE ENCOUNTER
Call to pt.  Advise per DR Stearns that remains anemic with low iron stores - iron has been ordered.  Will schedule a small bowel capsule.  Pt verb understanding.    Message to Salma MARK

## 2017-08-22 ENCOUNTER — OFFICE VISIT (OUTPATIENT)
Dept: FAMILY MEDICINE CLINIC | Facility: CLINIC | Age: 52
End: 2017-08-22

## 2017-08-22 VITALS
TEMPERATURE: 98.5 F | WEIGHT: 200 LBS | DIASTOLIC BLOOD PRESSURE: 80 MMHG | HEART RATE: 74 BPM | BODY MASS INDEX: 29.62 KG/M2 | OXYGEN SATURATION: 99 % | SYSTOLIC BLOOD PRESSURE: 120 MMHG | HEIGHT: 69 IN

## 2017-08-22 DIAGNOSIS — F41.9 ANXIETY AND DEPRESSION: ICD-10-CM

## 2017-08-22 DIAGNOSIS — F32.A ANXIETY AND DEPRESSION: ICD-10-CM

## 2017-08-22 DIAGNOSIS — J45.20 MILD INTERMITTENT ASTHMA WITHOUT COMPLICATION: ICD-10-CM

## 2017-08-22 DIAGNOSIS — R22.43 LOCALIZED SWELLING OF BOTH LOWER LEGS: ICD-10-CM

## 2017-08-22 DIAGNOSIS — J30.2 SEASONAL ALLERGIC RHINITIS, UNSPECIFIED ALLERGIC RHINITIS TRIGGER: ICD-10-CM

## 2017-08-22 DIAGNOSIS — E78.5 HYPERLIPIDEMIA, UNSPECIFIED HYPERLIPIDEMIA TYPE: ICD-10-CM

## 2017-08-22 DIAGNOSIS — D64.9 ANEMIA, UNSPECIFIED TYPE: ICD-10-CM

## 2017-08-22 DIAGNOSIS — Z00.00 PHYSICAL EXAM, ANNUAL: Primary | ICD-10-CM

## 2017-08-22 PROCEDURE — 99396 PREV VISIT EST AGE 40-64: CPT | Performed by: FAMILY MEDICINE

## 2017-08-22 PROCEDURE — 90471 IMMUNIZATION ADMIN: CPT | Performed by: FAMILY MEDICINE

## 2017-08-22 PROCEDURE — 90715 TDAP VACCINE 7 YRS/> IM: CPT | Performed by: FAMILY MEDICINE

## 2017-08-22 NOTE — PROGRESS NOTES
Subjective   Susy Sierra is a 52 y.o. female.   Chief Complaint   Patient presents with   • Annual Exam       History of Present Illness     #1 CPE- Patient has no complaints.  There is no change in past medical history.  No change in past surgical history or family history from last office visit.  She is not allergic to any new medications.  She is on no new prescription medications or over-the-counter medications.  She does not smoke cigarettes.  She never did.  She drinks alcohol 2-3 times a month.  She doesn't use drugs.  She does not exercise regularly.  She has dental appointments every 6 months.  Last eye exam was in 2017.  Last tetanus vaccine more than 10 years ago.  Shingles vaccine 6/2015.  Last mammogram in 2015.  Last Pap smear was years ago.  Patient had hysterectomy more than 10 years ago.  Secondary to benign condition.  She has never had an abnormal Pap smear.  Patient had colonoscopy and EGD in April 2017.    The following portions of the patient's history were reviewed and updated as appropriate: allergies, current medications, past family history, past medical history, past social history, past surgical history and problem list.    Review of Systems   Constitutional: Negative.    HENT: Negative.    Respiratory: Negative.    Cardiovascular: Negative.    Gastrointestinal: Negative.    Musculoskeletal: Positive for arthralgias.         Objective   Wt Readings from Last 3 Encounters:   08/22/17 200 lb (90.7 kg)   08/17/17 197 lb 12.8 oz (89.7 kg)   07/11/17 191 lb (86.6 kg)      Vitals:    08/22/17 0829   BP: 120/80   Pulse: 74   Temp: 98.5 °F (36.9 °C)   SpO2: 99%     Temp Readings from Last 3 Encounters:   08/22/17 98.5 °F (36.9 °C)   08/17/17 97.7 °F (36.5 °C) (Oral)   07/11/17 98.4 °F (36.9 °C)     BP Readings from Last 3 Encounters:   08/22/17 120/80   08/17/17 122/78   07/11/17 124/80     Pulse Readings from Last 3 Encounters:   08/22/17 74   07/11/17 84   04/25/17 72       Physical Exam    Constitutional: She is oriented to person, place, and time. She appears well-developed and well-nourished. No distress.   HENT:   Head: Normocephalic and atraumatic. Hair is normal.   Right Ear: Hearing, tympanic membrane, external ear and ear canal normal. No drainage. No decreased hearing is noted.   Left Ear: Hearing, tympanic membrane, external ear and ear canal normal. No decreased hearing is noted.   Nose: No nasal deformity.   Mouth/Throat: Oropharynx is clear and moist.   Eyes: Conjunctivae, EOM and lids are normal. Pupils are equal, round, and reactive to light. Right eye exhibits no discharge. Left eye exhibits no discharge.   Neck: Normal range of motion. Neck supple. No JVD present. No tracheal deviation present. No thyromegaly present.   Cardiovascular: Normal rate, regular rhythm, normal heart sounds, intact distal pulses and normal pulses.  Exam reveals no gallop and no friction rub.    No murmur heard.  Pulmonary/Chest: Effort normal and breath sounds normal. No respiratory distress. She has no wheezes. She has no rales. She exhibits no tenderness. Right breast exhibits no inverted nipple, no mass and no nipple discharge. Left breast exhibits no inverted nipple, no mass and no nipple discharge.   Abdominal: Soft. She exhibits no distension and no mass. There is no tenderness. There is no rebound and no guarding. No hernia.   Musculoskeletal: Normal range of motion. She exhibits no edema, tenderness or deformity.   Lymphadenopathy:     She has no cervical adenopathy.     She has no axillary adenopathy.   Neurological: She is alert and oriented to person, place, and time. She has normal reflexes. She displays normal reflexes. No cranial nerve deficit. She exhibits normal muscle tone. Coordination normal.   Skin: Skin is warm and dry. No rash noted. She is not diaphoretic. No erythema.   Psychiatric: She has a normal mood and affect. Her behavior is normal. Judgment and thought content normal.    Vitals reviewed.      Assessment/Plan   Susy was seen today for annual exam.    Diagnoses and all orders for this visit:    Physical exam, annual  -     Hepatitis C Antibody  -     Mammo Screening Bilateral With CAD; Future        #1 CMP- we are checking labs.  Patient is up-to-date with colon cancer screening.  I'm ordering mammogram.  She is post hysterectomy and she does not need Pap smear.  We talked about pelvic exam, patient prefers not to have it done.  She is getting tetanus vaccine today.  I'm advising exercise at least 30 minutes a day, 5 days a week.  Patient is scheduled with orthopedist about her hips.

## 2017-08-31 ENCOUNTER — OFFICE VISIT (OUTPATIENT)
Dept: CARDIOLOGY | Facility: CLINIC | Age: 52
End: 2017-08-31

## 2017-08-31 VITALS
WEIGHT: 198.6 LBS | HEART RATE: 75 BPM | SYSTOLIC BLOOD PRESSURE: 114 MMHG | BODY MASS INDEX: 29.41 KG/M2 | HEIGHT: 69 IN | DIASTOLIC BLOOD PRESSURE: 66 MMHG

## 2017-08-31 DIAGNOSIS — I87.2 CHRONIC VENOUS INSUFFICIENCY: ICD-10-CM

## 2017-08-31 DIAGNOSIS — I10 ESSENTIAL HYPERTENSION: Chronic | ICD-10-CM

## 2017-08-31 DIAGNOSIS — I20.1 CORONARY ARTERY SPASM (HCC): Primary | ICD-10-CM

## 2017-08-31 DIAGNOSIS — I34.1 MITRAL VALVE PROLAPSE: ICD-10-CM

## 2017-08-31 PROCEDURE — 93000 ELECTROCARDIOGRAM COMPLETE: CPT | Performed by: INTERNAL MEDICINE

## 2017-08-31 PROCEDURE — 99213 OFFICE O/P EST LOW 20 MIN: CPT | Performed by: INTERNAL MEDICINE

## 2017-09-12 ENCOUNTER — APPOINTMENT (OUTPATIENT)
Dept: MAMMOGRAPHY | Facility: HOSPITAL | Age: 52
End: 2017-09-12

## 2017-09-13 RX ORDER — ATORVASTATIN CALCIUM 40 MG/1
TABLET, FILM COATED ORAL
Qty: 90 TABLET | Refills: 0 | Status: SHIPPED | OUTPATIENT
Start: 2017-09-13 | End: 2017-12-19 | Stop reason: SDUPTHER

## 2017-09-18 ENCOUNTER — TELEPHONE (OUTPATIENT)
Dept: GASTROENTEROLOGY | Facility: CLINIC | Age: 52
End: 2017-09-18

## 2017-09-18 NOTE — TELEPHONE ENCOUNTER
----- Message from Gareth Macario sent at 9/18/2017  2:02 PM EDT -----  Regarding: PT CALLED WITH QUESTIONS   Contact: [phone]   PT IS CALLING STATING SHE IS ELZA FOR A CAPSULE ON OCT 3 BUT HAS QUESTIONS AS TO WHY SHE IS HAVING THIS DONE

## 2017-09-18 NOTE — TELEPHONE ENCOUNTER
Called pt and pt is asking why is she having capsule study.  Advised pt that her lab work is showing anemia and her c/s and egd were neg.  Advised the pt that they are trying to find why her blood count is low.  Advised the capsule study will take pictures of her small intestine to look for any areas of bleeding that could explain her lower blood count.  Pt verb understanding and had no further questions.

## 2017-09-25 ENCOUNTER — OFFICE VISIT (OUTPATIENT)
Dept: ORTHOPEDIC SURGERY | Facility: CLINIC | Age: 52
End: 2017-09-25

## 2017-09-25 VITALS — BODY MASS INDEX: 28.44 KG/M2 | HEIGHT: 69 IN | TEMPERATURE: 98.6 F | WEIGHT: 192 LBS

## 2017-09-25 DIAGNOSIS — M70.72 BURSITIS OF LEFT HIP: ICD-10-CM

## 2017-09-25 DIAGNOSIS — M25.552 BILATERAL HIP PAIN: Primary | ICD-10-CM

## 2017-09-25 DIAGNOSIS — M70.71 BURSITIS OF RIGHT HIP: ICD-10-CM

## 2017-09-25 DIAGNOSIS — M25.551 BILATERAL HIP PAIN: Primary | ICD-10-CM

## 2017-09-25 PROCEDURE — 73521 X-RAY EXAM HIPS BI 2 VIEWS: CPT | Performed by: ORTHOPAEDIC SURGERY

## 2017-09-25 PROCEDURE — 99243 OFF/OP CNSLTJ NEW/EST LOW 30: CPT | Performed by: ORTHOPAEDIC SURGERY

## 2017-09-25 PROCEDURE — 20610 DRAIN/INJ JOINT/BURSA W/O US: CPT | Performed by: ORTHOPAEDIC SURGERY

## 2017-09-25 RX ORDER — BUPIVACAINE HYDROCHLORIDE 5 MG/ML
2 INJECTION, SOLUTION EPIDURAL; INTRACAUDAL
Status: COMPLETED | OUTPATIENT
Start: 2017-09-25 | End: 2017-09-25

## 2017-09-25 RX ORDER — LIDOCAINE HYDROCHLORIDE 10 MG/ML
2 INJECTION, SOLUTION EPIDURAL; INFILTRATION; INTRACAUDAL; PERINEURAL
Status: COMPLETED | OUTPATIENT
Start: 2017-09-25 | End: 2017-09-25

## 2017-09-25 RX ORDER — METHYLPREDNISOLONE ACETATE 80 MG/ML
80 INJECTION, SUSPENSION INTRA-ARTICULAR; INTRALESIONAL; INTRAMUSCULAR; SOFT TISSUE
Status: COMPLETED | OUTPATIENT
Start: 2017-09-25 | End: 2017-09-25

## 2017-09-25 RX ADMIN — BUPIVACAINE HYDROCHLORIDE 2 ML: 5 INJECTION, SOLUTION EPIDURAL; INTRACAUDAL at 09:12

## 2017-09-25 RX ADMIN — METHYLPREDNISOLONE ACETATE 80 MG: 80 INJECTION, SUSPENSION INTRA-ARTICULAR; INTRALESIONAL; INTRAMUSCULAR; SOFT TISSUE at 09:12

## 2017-09-25 RX ADMIN — LIDOCAINE HYDROCHLORIDE 2 ML: 10 INJECTION, SOLUTION EPIDURAL; INFILTRATION; INTRACAUDAL; PERINEURAL at 09:12

## 2017-09-25 NOTE — PROGRESS NOTES
Patient Name: Susy Sierra   YOB: 1965  Referring Primary Care Physician: Renea Mixon MD      Chief Complaint:    Chief Complaint   Patient presents with   • Left Hip - Establish Care   • Right Hip - Establish Care        Subjective:    HPI:   Susy Sierra is a pleasant 52 y.o. year old who presents today for evaluation of   Chief Complaint   Patient presents with   • Left Hip - Establish Care   • Right Hip - Establish Care   mark chronic atraumatic hip pain, left more than right for yeaars.  Injection in [place] about a year ago.  Takes alleve off and on.  Doesn't raadiate.  Hurts in sleep    This problem is new to this examiner.     Medications:   Home Medications:  Current Outpatient Prescriptions on File Prior to Visit   Medication Sig   • albuterol (PROVENTIL HFA;VENTOLIN HFA) 108 (90 BASE) MCG/ACT inhaler Inhale 2 puffs Every 4 (Four) Hours As Needed for wheezing.   • atorvastatin (LIPITOR) 40 MG tablet TAKE 1 TABLET BY MOUTH DAILY   • cetirizine (zyrTEC) 10 MG tablet Take 10 mg by mouth Daily.   • fluticasone (FLONASE) 50 MCG/ACT nasal spray SHAKE LIQUID AND USE 2 SPRAYS IN EACH NOSTRIL DAILY   • furosemide (LASIX) 20 MG tablet Take 1 tablet by mouth Daily.   • Iron-Vitamin C (VITRON-C)  MG tablet Take 1 tablet by mouth 3 (Three) Times a Day With Meals.   • isosorbide mononitrate (IMDUR) 30 MG 24 hr tablet Take 1 tablet by mouth Daily.   • montelukast (SINGULAIR) 10 MG tablet Take 1 tablet by mouth Every Night.   • PARoxetine (PAXIL) 10 MG tablet Take 1 tablet by mouth Every Morning.   • polyethylene glycol (MIRALAX) packet Take 17 g by mouth Daily.     No current facility-administered medications on file prior to visit.      Current Medications:  Scheduled Meds:  Continuous Infusions:  No current facility-administered medications for this visit.   PRN Meds:.    I have reviewed the patient's medical history in detail and updated the computerized patient record.  Review and summarization  of old records includes:    Past Medical History:   Diagnosis Date   • Acute pyelonephritis     1/2017   • Anesthesia complication     difficulty waking   • Anxiety and depression 4/25/2017   • Asthma    • Chronic cough    • Chronic venous insufficiency    • Constipation    • Coronary artery spasm     cath 3/2016 with catheter induced spasm of ostial RCA, ? if she has actual coronary vasospasm at other times   • Essential hypertension    • Iron deficiency anemia due to chronic blood loss    • Mitral valve prolapse     with mild-moderate MR, 2/2017   • Seasonal allergies    • Sepsis due to Gram negative bacteria     due to pyelo        Past Surgical History:   Procedure Laterality Date   • CARPAL TUNNEL RELEASE Bilateral    • CHOLECYSTECTOMY     • COLONOSCOPY N/A 4/5/2017    IH, anal papilla were hypertrophied.  No bx   • ENDOSCOPY N/A 4/5/2017    z line irreg 38 cm from the incisors, bilious gastric fluid, gastritis  PATH: patchy mild chronic gastritis   • HYSTERECTOMY     • MANDIBLE SURGERY      for TMJ    • OVARIAN CYST REMOVAL     • SHOULDER SURGERY Bilateral     bone spurs        Social History     Occupational History   • Not on file.     Social History Main Topics   • Smoking status: Never Smoker   • Smokeless tobacco: Never Used   • Alcohol use 0.6 oz/week     1 Glasses of wine per week      Comment: once per wk    • Drug use: No   • Sexual activity: Defer      Social History     Social History Narrative        Family History   Problem Relation Age of Onset   • Heart disease Mother    • Heart disease Father    • Heart disease Sister    • Heart disease Paternal Grandmother    • Cancer Maternal Grandfather    • Cancer Paternal Grandfather    • Diabetes Sister        ROS: 14 point review of systems was performed and all other systems were reviewed and are negative except for documented findings in HPI and today's encounter.     Allergies:   Allergies   Allergen Reactions   • Motrin [Ibuprofen] Hives     vomiting      Constitutional:  Denies fever, shaking or chills   Eyes:  Denies change in visual acuity   HENT:  Denies nasal congestion or sore throat   Respiratory:  Denies cough or shortness of breath   Cardiovascular:  Denies chest pain or severe LE edema   GI:  Denies abdominal pain, nausea, vomiting, bloody stools or diarrhea   Musculoskeletal:  Numbness, tingling, pain, or loss of motor function only as noted above in history of present illness.  : Denies painful urination or hematuria  Integument:  Denies rash, lesion or ulceration   Neurologic:  Denies headache or focal weakness  Endocrine:  Denies lymphadenopathy  Psych:  Denies confusion or change in mental status   Hem:  Denies active bleeding    Objective:    Physical Exam: 52 y.o. female  Body mass index is 28.35 kg/(m^2)., @WT@, @HT@  Vitals:    09/25/17 0853   Temp: 98.6 °F (37 °C)     Vital signs reviewed.   General Appearance:    Alert, cooperative, in no acute distress                  Eyes: conjunctiva clear  ENT: external ears and nose atraumatic  CV: no peripheral edema  Resp: normal respiratory effort  Skin: no rashes or wounds; normal turgor  Psych: mood and affect appropriate  Lymph: no nodes appreciated  Neuro: gross sensation intact  Vascular:  Palpable peripheral pulse in noted extremity  Musculoskeletal Extremities: HIP Exam: normal gait without assistive device bilateral hips, Stinchfield negative, stiffness and trochanteric bursa tenderness 2+ pedal pulses and brisk capillary refill Pedal edema none    Radiology:   Imaging done today and discussed at length with the patient:    Indication: pain related symptoms,  Views: 2V AP&LAT bilateral hip(s)   Findings: moderate arthritis  Comparison views: not available      Assessment:     ICD-10-CM ICD-9-CM   1. Bilateral hip pain M25.551 719.45    M25.552    2. Bursitis of right hip M70.71 726.5   3. Bursitis of left hip M70.72 726.5        Large Joint Arthrocentesis  Date/Time: 9/25/2017 9:12  AM  Consent given by: patient  Site marked: site marked  Timeout: Immediately prior to procedure a time out was called to verify the correct patient, procedure, equipment, support staff and site/side marked as required   Supporting Documentation  Indications: pain and joint swelling   Procedure Details  Location: hip - L greater trochanteric bursa  Preparation: Patient was prepped and draped in the usual sterile fashion  Needle size: 22 G  Approach: anterolateral  Medications administered: 80 mg methylPREDNISolone acetate 80 MG/ML; 2 mL bupivacaine (PF) 0.5 %; 2 mL lidocaine PF 1% 1 %  Patient tolerance: patient tolerated the procedure well with no immediate complications    Large Joint Arthrocentesis  Date/Time: 9/25/2017 9:12 AM  Consent given by: patient  Site marked: site marked  Timeout: Immediately prior to procedure a time out was called to verify the correct patient, procedure, equipment, support staff and site/side marked as required   Supporting Documentation  Indications: pain and joint swelling   Procedure Details  Location: hip - R greater trochanteric bursa  Preparation: Patient was prepped and draped in the usual sterile fashion  Needle size: 22 G  Approach: anterolateral  Medications administered: 80 mg methylPREDNISolone acetate 80 MG/ML; 2 mL bupivacaine (PF) 0.5 %; 2 mL lidocaine PF 1% 1 %  Patient tolerance: patient tolerated the procedure well with no immediate complications             Plan: Biomechanics of pertinent body area discussed.  Risks, benefits, alternatives, comparisons, and complications of accepted medicines, injections, recommendations, surgical procedures, and therapies explained and education provided in laymen's terms. The patient was given the opportunity to ask questions and they were answerved to their satisfaction.   Natural history and expected course of this patient's diagnosis discussed along with evaluation of therapies. Questions answered.  Cortisone Injection for  DIAGNOSTIC and THERAPUTIC purposes.  Cryotherapy/brachy therapy as indicated with instructions.   PT referral offered and declined, advised on stretching/strengthening exercises.  This Consult is done at the request of this patient's PCP (as listed at the top of this note)  to whom I will send this report with this rendered opinion.      9/25/2017

## 2017-11-13 RX ORDER — FUROSEMIDE 20 MG/1
TABLET ORAL
Qty: 90 TABLET | Refills: 0 | Status: SHIPPED | OUTPATIENT
Start: 2017-11-13 | End: 2019-01-15 | Stop reason: SDUPTHER

## 2017-11-13 RX ORDER — ISOSORBIDE MONONITRATE 30 MG/1
TABLET, EXTENDED RELEASE ORAL
Qty: 30 TABLET | Refills: 6 | Status: SHIPPED | OUTPATIENT
Start: 2017-11-13 | End: 2019-01-15 | Stop reason: SDUPTHER

## 2017-12-19 RX ORDER — ATORVASTATIN CALCIUM 40 MG/1
TABLET, FILM COATED ORAL
Qty: 30 TABLET | Refills: 0 | Status: SHIPPED | OUTPATIENT
Start: 2017-12-19 | End: 2019-01-15 | Stop reason: SDUPTHER

## 2018-01-19 RX ORDER — MONTELUKAST SODIUM 10 MG/1
TABLET ORAL
Qty: 90 TABLET | Refills: 0 | Status: SHIPPED | OUTPATIENT
Start: 2018-01-19

## 2018-02-19 RX ORDER — FUROSEMIDE 20 MG/1
TABLET ORAL
Qty: 90 TABLET | Refills: 0 | OUTPATIENT
Start: 2018-02-19

## 2018-02-26 RX ORDER — ATORVASTATIN CALCIUM 40 MG/1
TABLET, FILM COATED ORAL
Qty: 30 TABLET | Refills: 0 | OUTPATIENT
Start: 2018-02-26

## 2018-08-31 ENCOUNTER — TELEPHONE (OUTPATIENT)
Dept: INTERNAL MEDICINE | Facility: CLINIC | Age: 53
End: 2018-08-31

## 2018-09-20 ENCOUNTER — APPOINTMENT (OUTPATIENT)
Dept: GENERAL RADIOLOGY | Facility: HOSPITAL | Age: 53
End: 2018-09-20

## 2018-09-20 PROCEDURE — 73610 X-RAY EXAM OF ANKLE: CPT | Performed by: GENERAL PRACTICE

## 2018-09-20 PROCEDURE — 73630 X-RAY EXAM OF FOOT: CPT | Performed by: GENERAL PRACTICE

## 2019-01-02 ENCOUNTER — OFFICE VISIT CONVERTED (OUTPATIENT)
Dept: FAMILY MEDICINE CLINIC | Age: 54
End: 2019-01-02
Attending: FAMILY MEDICINE

## 2019-01-15 ENCOUNTER — OFFICE VISIT (OUTPATIENT)
Dept: CARDIOLOGY | Facility: CLINIC | Age: 54
End: 2019-01-15

## 2019-01-15 ENCOUNTER — LAB (OUTPATIENT)
Dept: LAB | Facility: HOSPITAL | Age: 54
End: 2019-01-15

## 2019-01-15 VITALS
DIASTOLIC BLOOD PRESSURE: 70 MMHG | BODY MASS INDEX: 27.16 KG/M2 | HEART RATE: 74 BPM | HEIGHT: 69 IN | SYSTOLIC BLOOD PRESSURE: 124 MMHG | WEIGHT: 183.4 LBS

## 2019-01-15 DIAGNOSIS — I10 ESSENTIAL HYPERTENSION: Chronic | ICD-10-CM

## 2019-01-15 DIAGNOSIS — I34.0 NON-RHEUMATIC MITRAL REGURGITATION: ICD-10-CM

## 2019-01-15 DIAGNOSIS — R60.0 LOWER EXTREMITY EDEMA: ICD-10-CM

## 2019-01-15 DIAGNOSIS — E78.5 HYPERLIPIDEMIA, UNSPECIFIED HYPERLIPIDEMIA TYPE: ICD-10-CM

## 2019-01-15 DIAGNOSIS — R07.89 ATYPICAL CHEST PAIN: ICD-10-CM

## 2019-01-15 DIAGNOSIS — J45.20 MILD INTERMITTENT ASTHMA WITHOUT COMPLICATION: ICD-10-CM

## 2019-01-15 DIAGNOSIS — I34.1 MITRAL VALVE PROLAPSE: ICD-10-CM

## 2019-01-15 DIAGNOSIS — R07.89 ATYPICAL CHEST PAIN: Primary | ICD-10-CM

## 2019-01-15 DIAGNOSIS — R06.02 SHORTNESS OF BREATH: ICD-10-CM

## 2019-01-15 DIAGNOSIS — I87.2 CHRONIC VENOUS INSUFFICIENCY: ICD-10-CM

## 2019-01-15 LAB
ALBUMIN SERPL-MCNC: 4 G/DL (ref 3.5–5.2)
ALBUMIN/GLOB SERPL: 1 G/DL
ALP SERPL-CCNC: 96 U/L (ref 39–117)
ALT SERPL W P-5'-P-CCNC: 16 U/L (ref 1–33)
ANION GAP SERPL CALCULATED.3IONS-SCNC: 10.6 MMOL/L
ANISOCYTOSIS BLD QL: NORMAL
AST SERPL-CCNC: 13 U/L (ref 1–32)
BASOPHILS # BLD AUTO: 0.02 10*3/MM3 (ref 0–0.2)
BASOPHILS NFR BLD AUTO: 0.5 % (ref 0–1.5)
BILIRUB SERPL-MCNC: 0.3 MG/DL (ref 0.1–1.2)
BUN BLD-MCNC: 11 MG/DL (ref 6–20)
BUN/CREAT SERPL: 13.4 (ref 7–25)
CALCIUM SPEC-SCNC: 9.5 MG/DL (ref 8.6–10.5)
CHLORIDE SERPL-SCNC: 105 MMOL/L (ref 98–107)
CHOLEST SERPL-MCNC: 161 MG/DL (ref 0–200)
CO2 SERPL-SCNC: 26.4 MMOL/L (ref 22–29)
CREAT BLD-MCNC: 0.82 MG/DL (ref 0.57–1)
DEPRECATED RDW RBC AUTO: 45.7 FL (ref 37–54)
EOSINOPHIL # BLD AUTO: 0.06 10*3/MM3 (ref 0–0.7)
EOSINOPHIL NFR BLD AUTO: 1.4 % (ref 0.3–6.2)
ERYTHROCYTE [DISTWIDTH] IN BLOOD BY AUTOMATED COUNT: 17.1 % (ref 11.7–13)
GFR SERPL CREATININE-BSD FRML MDRD: 73 ML/MIN/1.73
GLOBULIN UR ELPH-MCNC: 3.9 GM/DL
GLUCOSE BLD-MCNC: 90 MG/DL (ref 65–99)
HCT VFR BLD AUTO: 36.3 % (ref 35.6–45.5)
HDLC SERPL-MCNC: 43 MG/DL (ref 40–60)
HGB BLD-MCNC: 10.6 G/DL (ref 11.9–15.5)
HYPOCHROMIA BLD QL: NORMAL
IMM GRANULOCYTES # BLD AUTO: 0.01 10*3/MM3 (ref 0–0.03)
IMM GRANULOCYTES NFR BLD AUTO: 0.2 % (ref 0–0.5)
LDLC SERPL CALC-MCNC: 80 MG/DL (ref 0–100)
LDLC/HDLC SERPL: 1.87 {RATIO}
LYMPHOCYTES # BLD AUTO: 1.56 10*3/MM3 (ref 0.9–4.8)
LYMPHOCYTES NFR BLD AUTO: 35.5 % (ref 19.6–45.3)
MCH RBC QN AUTO: 21.4 PG (ref 26.9–32)
MCHC RBC AUTO-ENTMCNC: 29.2 G/DL (ref 32.4–36.3)
MCV RBC AUTO: 73.3 FL (ref 80.5–98.2)
MONOCYTES # BLD AUTO: 0.31 10*3/MM3 (ref 0.2–1.2)
MONOCYTES NFR BLD AUTO: 7.1 % (ref 5–12)
NEUTROPHILS # BLD AUTO: 2.44 10*3/MM3 (ref 1.9–8.1)
NEUTROPHILS NFR BLD AUTO: 55.5 % (ref 42.7–76)
NT-PROBNP SERPL-MCNC: 69 PG/ML (ref 5–900)
PLAT MORPH BLD: NORMAL
PLATELET # BLD AUTO: 386 10*3/MM3 (ref 140–500)
PMV BLD AUTO: 9.3 FL (ref 6–12)
POTASSIUM BLD-SCNC: 4.1 MMOL/L (ref 3.5–5.2)
PROT SERPL-MCNC: 7.9 G/DL (ref 6–8.5)
RBC # BLD AUTO: 4.95 10*6/MM3 (ref 3.9–5.2)
SODIUM BLD-SCNC: 142 MMOL/L (ref 136–145)
TRIGL SERPL-MCNC: 189 MG/DL (ref 0–150)
TSH SERPL DL<=0.05 MIU/L-ACNC: 0.7 MIU/ML (ref 0.27–4.2)
VLDLC SERPL-MCNC: 37.8 MG/DL (ref 5–40)
WBC MORPH BLD: NORMAL
WBC NRBC COR # BLD: 4.39 10*3/MM3 (ref 4.5–10.7)

## 2019-01-15 PROCEDURE — 36415 COLL VENOUS BLD VENIPUNCTURE: CPT

## 2019-01-15 PROCEDURE — 80053 COMPREHEN METABOLIC PANEL: CPT

## 2019-01-15 PROCEDURE — 85025 COMPLETE CBC W/AUTO DIFF WBC: CPT

## 2019-01-15 PROCEDURE — 80061 LIPID PANEL: CPT

## 2019-01-15 PROCEDURE — 99214 OFFICE O/P EST MOD 30 MIN: CPT | Performed by: NURSE PRACTITIONER

## 2019-01-15 PROCEDURE — 83880 ASSAY OF NATRIURETIC PEPTIDE: CPT | Performed by: NURSE PRACTITIONER

## 2019-01-15 PROCEDURE — 93000 ELECTROCARDIOGRAM COMPLETE: CPT | Performed by: NURSE PRACTITIONER

## 2019-01-15 PROCEDURE — 84443 ASSAY THYROID STIM HORMONE: CPT

## 2019-01-15 PROCEDURE — 85007 BL SMEAR W/DIFF WBC COUNT: CPT

## 2019-01-15 RX ORDER — NAPROXEN SODIUM 220 MG
220 TABLET ORAL 2 TIMES DAILY PRN
COMMUNITY

## 2019-01-15 RX ORDER — FUROSEMIDE 20 MG/1
20 TABLET ORAL DAILY
Qty: 90 TABLET | Refills: 1 | Status: SHIPPED | OUTPATIENT
Start: 2019-01-15

## 2019-01-15 RX ORDER — ISOSORBIDE MONONITRATE 30 MG/1
30 TABLET, EXTENDED RELEASE ORAL DAILY
Qty: 90 TABLET | Refills: 1 | Status: SHIPPED | OUTPATIENT
Start: 2019-01-15

## 2019-01-15 RX ORDER — ATORVASTATIN CALCIUM 40 MG/1
40 TABLET, FILM COATED ORAL DAILY
Qty: 90 TABLET | Refills: 1 | Status: SHIPPED | OUTPATIENT
Start: 2019-01-15

## 2019-01-15 NOTE — PROGRESS NOTES
Date of Office Visit: 01/15/2019  Encounter Provider: CASSIDY Koroma  Place of Service: [facility] CARDIOLOGY  Patient Name: Susy Sierra  :1965      Chief Complaint   Patient presents with   • Shortness of Breath   • Chest Pain   • Edema   :     Dear Dr. Hayward,     HPI: Susy Sierra is a pleasant 53 y.o. female who presents today for cardiac follow up. She is a new patient to me and her previous records have been reviewed.  She has a history of anxiety, depression, asthma, chronic venous insufficiency, hypertension, iron deficiency anemia and seasonal allergies.    She was seen by cardiologist in  for dyspnea and had a normal echocardiogram.  In  she developed worsening exertional dyspnea and echocardiogram revealed an EF of 60%, grade 1 diastolic dysfunction, mild RV enlargement, and mild mitral regurgitation.  The nuclear stress test was reported negative, but she stated her ejection fraction was lower than seen on the echocardiogram.  Her symptoms persisted so she underwent cardiac catheterization in 2016 and her coronary arteries were normal but there was some catheter induced vasospasm noted in the ostial RCA.  She was treated with amlodipine and isosorbide.  Her symptoms improved, but did not resolve.  Subsequently her amlodipine was discontinued due to leg swelling.  Her isosorbide was increased and losartan was discontinued because of low blood pressure.  She had a repeat echocardiogram in 2017 which revealed a normal ejection fraction, mild mitral valve prolapse, mild to moderate mitral regurgitation.    She presents today with a chief concern of shortness of breath, chest pain, lower extremity edema over the past 2 months.  She has not been taking her medications since 2017 because she did not have insurance.  She describes her chest pain is a quick poking sensation that lasts a few seconds on a daily basis and does not worsen with  exertion nor resolve with rest.  She reports mild dyspnea with exertion and at rest over the past 2 months and lower extremity edema.  Her weight is actually down 15 pounds since her visit in August 2017.  She denies PND, orthopnea, palpitations, dizziness, or syncope.  She feels that her asthma is well-controlled.  She has not had any recent blood work completed.      Past Medical History:   Diagnosis Date   • Acute pyelonephritis     1/2017   • Anesthesia complication     difficulty waking   • Anxiety and depression 4/25/2017   • Asthma    • Chronic cough    • Chronic venous insufficiency    • Constipation    • Coronary artery spasm (CMS/HCC)     cath 3/2016 with catheter induced spasm of ostial RCA, ? if she has actual coronary vasospasm at other times   • Essential hypertension    • Iron deficiency anemia due to chronic blood loss    • Mitral valve prolapse     with mild-moderate MR, 2/2017   • Seasonal allergies    • Sepsis due to Gram negative bacteria (CMS/HCC)     due to pyelo       Past Surgical History:   Procedure Laterality Date   • CARPAL TUNNEL RELEASE Bilateral    • CHOLECYSTECTOMY     • COLONOSCOPY N/A 4/5/2017    IH, anal papilla were hypertrophied.  No bx   • ENDOSCOPY N/A 4/5/2017    z line irreg 38 cm from the incisors, bilious gastric fluid, gastritis  PATH: patchy mild chronic gastritis   • HYSTERECTOMY     • MANDIBLE SURGERY      for TMJ    • OVARIAN CYST REMOVAL     • SHOULDER SURGERY Bilateral     bone spurs       Social History     Socioeconomic History   • Marital status:      Spouse name: Not on file   • Number of children: Not on file   • Years of education: Not on file   • Highest education level: Not on file   Social Needs   • Financial resource strain: Not on file   • Food insecurity - worry: Not on file   • Food insecurity - inability: Not on file   • Transportation needs - medical: Not on file   • Transportation needs - non-medical: Not on file   Occupational History   • Not  on file   Tobacco Use   • Smoking status: Never Smoker   • Smokeless tobacco: Never Used   • Tobacco comment: Caffeine use   Substance and Sexual Activity   • Alcohol use: Yes     Alcohol/week: 0.6 oz     Types: 1 Glasses of wine per week     Comment: once per wk    • Drug use: No   • Sexual activity: Defer   Other Topics Concern   • Not on file   Social History Narrative   • Not on file       Family History   Problem Relation Age of Onset   • Heart disease Mother    • Heart disease Father    • Heart disease Sister    • Heart disease Paternal Grandmother    • Cancer Maternal Grandfather    • Cancer Paternal Grandfather    • Diabetes Sister        The following portion of the patient's history were reviewed and updated as appropriate: past medical history, past surgical history, past social history, past family history, allergies, current medications, and problem list.    Review of Systems   Constitution: Positive for malaise/fatigue. Negative for chills, diaphoresis, fever, night sweats, weight gain and weight loss.   HENT: Negative for hearing loss, nosebleeds, sore throat and tinnitus.    Eyes: Negative for blurred vision, double vision, pain and visual disturbance.   Cardiovascular: Positive for dyspnea on exertion and leg swelling. Negative for chest pain, claudication, cyanosis, irregular heartbeat, near-syncope, orthopnea, palpitations, paroxysmal nocturnal dyspnea and syncope.   Respiratory: Positive for shortness of breath. Negative for cough, hemoptysis, snoring and wheezing.    Endocrine: Negative for cold intolerance, heat intolerance and polyuria.   Hematologic/Lymphatic: Negative for bleeding problem. Does not bruise/bleed easily.   Skin: Negative for color change, dry skin, flushing and itching.   Musculoskeletal: Positive for joint pain. Negative for falls, joint swelling, muscle cramps, muscle weakness and myalgias.   Gastrointestinal: Negative for abdominal pain, constipation, heartburn, melena,  "nausea and vomiting.   Genitourinary: Negative for dysuria and hematuria.   Neurological: Negative for excessive daytime sleepiness, dizziness, light-headedness, loss of balance, numbness, paresthesias, seizures and vertigo.   Psychiatric/Behavioral: Positive for depression. Negative for altered mental status, memory loss and substance abuse. The patient does not have insomnia and is not nervous/anxious.    Allergic/Immunologic: Negative for environmental allergies.       Allergies   Allergen Reactions   • Motrin [Ibuprofen] Hives     vomiting         Current Outpatient Medications:   •  albuterol (PROVENTIL HFA;VENTOLIN HFA) 108 (90 BASE) MCG/ACT inhaler, Inhale 2 puffs Every 4 (Four) Hours As Needed for wheezing., Disp: , Rfl:   •  naproxen sodium (ALEVE) 220 MG tablet, Take 220 mg by mouth 2 (Two) Times a Day As Needed., Disp: , Rfl:   •  PARoxetine (PAXIL) 10 MG tablet, Take 1 tablet by mouth Every Morning., Disp: 90 tablet, Rfl: 1  •  atorvastatin (LIPITOR) 40 MG tablet, Take 1 tablet by mouth Daily., Disp: 90 tablet, Rfl: 1  •  cetirizine (zyrTEC) 10 MG tablet, Take 10 mg by mouth Daily., Disp: , Rfl:   •  fluticasone (FLONASE) 50 MCG/ACT nasal spray, SHAKE LIQUID AND USE 2 SPRAYS IN EACH NOSTRIL DAILY, Disp: 48 mL, Rfl: 5  •  furosemide (LASIX) 20 MG tablet, Take 1 tablet by mouth Daily., Disp: 90 tablet, Rfl: 1  •  Iron-Vitamin C (VITRON-C)  MG tablet, Take 1 tablet by mouth 3 (Three) Times a Day With Meals., Disp: 180 tablet, Rfl: 2  •  isosorbide mononitrate (IMDUR) 30 MG 24 hr tablet, Take 1 tablet by mouth Daily., Disp: 90 tablet, Rfl: 1  •  montelukast (SINGULAIR) 10 MG tablet, TAKE 1 TABLET BY MOUTH EVERY NIGHT, Disp: 90 tablet, Rfl: 0  •  polyethylene glycol (MIRALAX) packet, Take 17 g by mouth Daily., Disp: 30 each, Rfl: 0        Objective:     Vitals:    01/15/19 0832   BP: 124/70   BP Location: Left arm   Pulse: 74   Weight: 83.2 kg (183 lb 6.4 oz)   Height: 175.3 cm (69\")     Body mass index " is 27.08 kg/m².    PHYSICAL EXAM:    Vitals Reviewed.   General Appearance: No acute distress, well developed and well nourished.   Eyes: Conjunctiva and lids: No erythema, swelling, or discharge. Sclera non-icteric.   HENT: Atraumatic, normocephalic. External eyes, ears, and nose normal. No hearing loss noted. Mucous membranes normal. Lips not cyanotic. Neck supple with no tenderness.  Respiratory: No signs of respiratory distress. Respiration rhythm and depth normal.   Clear to auscultation. No rales, crackles, rhonchi, or wheezing auscultated.   Cardiovascular:  Jugular Venous Pressure: Normal  Heart Rate and Rhythm: Normal, Heart Sounds: Normal S1 and S2. No S3 or S4 noted.  Murmurs: No murmurs noted. No rubs, thrills, or gallops.   Arterial Pulses: Carotid pulses normal. No carotid bruit noted. Posterior tibialis and dorsalis pedis pulses normal.   Lower Extremities: Bilateral trace lower extremity edema noted.  Gastrointestinal:  Abdomen soft, non-distended, non-tender. Normal bowel sounds. No hepatomegaly.   Musculoskeletal: Normal movement of extremities  Skin and Nails: General appearance normal. No pallor, cyanosis, diaphoresis. Skin temperature normal. No clubbing of fingernails.   Psychiatric: Patient alert and oriented to person, place, and time. Speech and behavior appropriate. Normal mood and affect.       ECG 12 Lead  Date/Time: 1/15/2019 8:39 AM  Performed by: Cat Benito APRN  Authorized by: Cat Benito APRN   Comparison: compared with previous ECG from 8/31/2017  Rhythm: sinus rhythm  Rate: normal  BPM: 74  Conduction: conduction normal  ST Segments: ST segments normal  T Waves: T waves normal  QRS axis: normal  Clinical impression: non-specific ECG  Comments: RSR V1 Prime              Assessment:       Diagnosis Plan   1. Atypical chest pain  ECG 12 Lead    Comprehensive Metabolic Panel    CBC Auto Differential    Lipid Panel    proBNP    TSH    Adult Transthoracic Echo Complete W/  Cont if Necessary Per Protocol   2. Shortness of breath  Comprehensive Metabolic Panel    CBC Auto Differential    Lipid Panel    proBNP    TSH    Adult Transthoracic Echo Complete W/ Cont if Necessary Per Protocol   3. Lower extremity edema     4. Chronic venous insufficiency     5. Mitral valve prolapse  Adult Transthoracic Echo Complete W/ Cont if Necessary Per Protocol   6. Non-rheumatic mitral regurgitation  Adult Transthoracic Echo Complete W/ Cont if Necessary Per Protocol   7. Essential hypertension     8. Hyperlipidemia, unspecified hyperlipidemia type     9. Mild intermittent asthma without complication            Plan:       1.  Chest Pain and Shortness of Breath: Dr. Campoverde as noted in the past that this remains a questionable diagnosis if she's having coronary vasospasms are causing these symptoms.  The patient has requested to go back on her isosorbide and furosemide.  I've asked for some baseline blood work to be completed including a CBC, CMP, proBNP, TSH, and lipid panel.  I've refilled both the isosorbide and furosemide.    2.  Bilateral Lower Extremity Edema: She had mild trace lower extremity edema present today and will restart her Lasix.  She has a past medical history of venous insufficiency.    3.  Mitral Valve Disease: She is been noted to have mitral valve prolapse and mild to moderate mitral regurgitation per echocardiogram in the past.  Repeat echocardiogram.    4.  Hypertension: Blood pressure is well-controlled.    5.  Hyperlipidemia: She previously was on atorvastatin every felt this medication for her.  We'll recheck a lipid panel today.    6.  Asthma: She feels that her shortness of breath that she is currently experiencing is different than her asthma symptoms in the past.  This could be a differential for her shortness of breath.    7.  Follow up with me in 3 months for reassessment of medication restart and follow-up with Dr. Campoverde in one year.    Addendum 1/28/19: She was a no show  "for echocardiogram scheduled 1/24/19.  Klaus, our  called her and she said she would schedule it \"when convenient\".    As always, it has been a pleasure to participate in your patient's care. Thank you.       Sincerely,         CASSIDY Pittman                "

## 2019-01-16 ENCOUNTER — TELEPHONE (OUTPATIENT)
Dept: CARDIOLOGY | Facility: CLINIC | Age: 54
End: 2019-01-16

## 2019-01-24 ENCOUNTER — APPOINTMENT (OUTPATIENT)
Dept: CARDIOLOGY | Facility: HOSPITAL | Age: 54
End: 2019-01-24

## 2019-01-25 ENCOUNTER — TELEPHONE (OUTPATIENT)
Dept: CARDIOLOGY | Facility: CLINIC | Age: 54
End: 2019-01-25

## 2019-01-25 NOTE — TELEPHONE ENCOUNTER
----- Message from CASSIDY Palmer sent at 1/15/2019  9:19 AM EST -----    Follow-up on echocardiogram 1/24/19

## 2021-05-18 NOTE — PROGRESS NOTES
Susy Sierra 1965     Office/Outpatient Visit    Visit Date: Wed, Jan 2, 2019 02:32 pm    Provider: Bobo Hayward MD (Assistant: Chelsie Fermin MA)    Location: [facility]        Electronically signed by Bobo Hayward MD on  01/02/2019 05:32:51 PM                             SUBJECTIVE:        CC:     Ms. Sierra is a 53 year old female.  This is her first visit to the clinic.      Patient is here to establish care and refill medications.;         HPI:         PHQ-9 Depression Screening: Completed form scanned and in chart; Total Score 10 Alcohol Consumption Screening: Completed form scanned and in chart; Total Score 3         Concerning depression responsive to treatment, the diagnosis of depression was made several years ago.  Current medications include an antidepressant.  DOING WELL ON CURRENT MEDS BUT HAS BEEN OUT FOR A WHILE.          Complaint of prinzmetal angina..  The symptom began years ago.  STATES NEGATIVE CATH, WILL SEE CARDIOLOGY AGAIN SOON.  FEELS OK TODAY.      ROS:     CONSTITUTIONAL:  Negative for chills and fever.      E/N/T:  Negative for ear pain, nasal congestion and sore throat.      CARDIOVASCULAR:  Negative for chest pain, palpitations and pedal edema.      RESPIRATORY:  Negative for recent cough and dyspnea.      GASTROINTESTINAL:  Negative for abdominal pain, nausea and vomiting.      NEUROLOGICAL:  Negative for headaches.          Current Problems:     Last Reviewed on 1/02/2019 05:25 PM by oBbo Hayward    Prinzmetal angina     Allergies     Asthma (mild)     Depression responsive to treatment     Screening for depression         Immunizations:     None        Allergies:     Last Reviewed on 1/02/2019 05:24 PM by Bobo Hayward    Motrin: hives, vomiting        Current Medications:     Last Reviewed on 1/02/2019 05:25 PM by Bobo Hayward    Montelukast Sodium 10mg Tablet 1 tab daily     OTC Aleve Take as needed     Paroxetine HCl  10mg Tablet 1 tab daily     ProAir HFA 90mcg/1actuation Oral Inhaler 1 puff daily as needed         OBJECTIVE:        Vitals:         Current: 1/2/2019 2:40:58 PM    Ht:  5 ft, 9 in;  Wt: 182.2 lbs;  BMI: 26.9    T: 98.4 F (oral);  BP: 160/76 mm Hg (left arm, sitting);  P: 88 bpm (left arm (BP Cuff), sitting)        Exams:     PHYSICAL EXAM:     GENERAL: vital signs recorded - well developed, well nourished;  no apparent distress;     NECK: trachea is midline; thyroid is non-palpable;     RESPIRATORY: normal respiratory rate and pattern with no distress; normal breath sounds with no rales, rhonchi, wheezes or rubs;     CARDIOVASCULAR: normal rate; rhythm is regular;  no systolic murmur; no edema;     LYMPHATIC: no enlargement of cervical or facial nodes;     NEUROLOGIC: GROSSLY INTACT     PSYCHIATRIC:  appropriate affect and demeanor; normal speech pattern; grossly normal memory;         Lab/Test Results:         LABORATORY RESULTS: HAS NOT HAD ROUTINE LABS FOR A WHILE         ASSESSMENT           296.25   F34.1  Depression responsive to treatment              DDx:     413.1   I20.1  Prinzmetal angina              DDx:     V79.0   Z13.89  Screening for depression              DDx:         ORDERS:         Meds Prescribed:       Refill of: Montelukast Sodium 10mg Tablet 1 tab daily  #30 (Thirty) tablet(s) Refills: 5       Refill of: Paroxetine HCl 10mg Tablet 1 tab daily  #30 (Thirty) tablet(s) Refills: 5       Refill of: ProAir HFA (Albuterol) 90mcg/1actuation Oral Inhaler 2 puffs Q 4 PRN  #1 (One) 8.5 gm inhaler Refills: 0                 PLAN:          Depression responsive to treatment         MEDICATIONS: (no change to current medication regimen)     FOLLOW-UP: Schedule a follow-up visit in 6 months. LABS AT NEXT VISIT AND WILL DISCUSS MAMMOGRAM.            Prescriptions:       Refill of: Paroxetine HCl 10mg Tablet 1 tab daily  #30 (Thirty) tablet(s) Refills: 5          Prinzmetal angina         RECOMMENDATIONS  given include: F/U WITH CARDIOLOGY AS PLANNED.              Other Prescriptions:       Refill of: Montelukast Sodium 10mg Tablet 1 tab daily  #30 (Thirty) tablet(s) Refills: 5       Refill of: ProAir HFA (Albuterol) 90mcg/1actuation Oral Inhaler 2 puffs Q 4 PRN  #1 (One) 8.5 gm inhaler Refills: 0         Patient Recommendations:        For  Depression responsive to treatment:     Schedule a follow-up visit in 6 months.              CHARGE CAPTURE           **Please note: ICD descriptions below are intended for billing purposes only and may not represent clinical diagnoses**        Primary Diagnosis:         296.25 Depression responsive to treatment            F34.1    Dysthymic disorder              Orders:          [id number]   Office visit - new pt, level 3  (In-House)           413.1 Prinzmetal angina            I20.1    Angina pectoris with documented spasm    V79.0 Screening for depression            Z13.89    Encounter for screening for other disorder

## 2021-05-29 NOTE — TELEPHONE ENCOUNTER
TSH normal.  CMP normal.  Lipid panel normal except for triglycerides 189.  Pro BNP normal.  CBC abnormal and I've recommend follow-up with PCP about anemia.    Patient informed & verbalizes understanding.  
----- Message from CASSIDY Palmer sent at 1/15/2019  9:20 AM EST -----    Follow-up on blood work  
No

## 2021-07-01 VITALS
HEIGHT: 69 IN | DIASTOLIC BLOOD PRESSURE: 76 MMHG | TEMPERATURE: 98.4 F | WEIGHT: 182.2 LBS | SYSTOLIC BLOOD PRESSURE: 160 MMHG | BODY MASS INDEX: 26.98 KG/M2 | HEART RATE: 88 BPM

## (undated) DEVICE — THE TORRENT IRRIGATION SCOPE CONNECTOR IS USED WITH THE TORRENT IRRIGATION TUBING TO PROVIDE IRRIGATION FLUIDS SUCH AS STERILE WATER DURING GASTROINTESTINAL ENDOSCOPIC PROCEDURES WHEN USED IN CONJUNCTION WITH AN IRRIGATION PUMP (OR ELECTROSURGICAL UNIT).: Brand: TORRENT

## (undated) DEVICE — Device: Brand: DEFENDO AIR/WATER/SUCTION AND BIOPSY VALVE

## (undated) DEVICE — CANN NASL CO2 TRULINK W/O2 A/

## (undated) DEVICE — BITEBLOCK OMNI BLOC

## (undated) DEVICE — SINGLE-USE BIOPSY FORCEPS: Brand: RADIAL JAW 4

## (undated) DEVICE — TUBING, SUCTION, 1/4" X 10', STRAIGHT: Brand: MEDLINE